# Patient Record
Sex: FEMALE | Race: WHITE | Employment: FULL TIME | URBAN - METROPOLITAN AREA
[De-identification: names, ages, dates, MRNs, and addresses within clinical notes are randomized per-mention and may not be internally consistent; named-entity substitution may affect disease eponyms.]

---

## 2020-07-19 ENCOUNTER — NURSE ONLY (OUTPATIENT)
Dept: FAMILY MEDICINE CLINIC | Age: 50
End: 2020-07-19

## 2020-07-19 NOTE — PROGRESS NOTES
Pt sent to [facility] flu clinic by PCP for Covid-19 testing. Patient arrived as a drive-thru visit and specimen was collected via oropharyngeal route and sent to lab. The flu clinic provider did not perform a physical assessment.

## 2020-07-22 ENCOUNTER — HOSPITAL ENCOUNTER (INPATIENT)
Age: 50
LOS: 3 days | Discharge: HOME | DRG: 190 | End: 2020-07-25
Attending: EMERGENCY MEDICINE | Admitting: INTERNAL MEDICINE
Payer: COMMERCIAL

## 2020-07-22 ENCOUNTER — APPOINTMENT (OUTPATIENT)
Dept: GENERAL RADIOLOGY | Age: 50
DRG: 190 | End: 2020-07-22
Payer: COMMERCIAL

## 2020-07-22 PROBLEM — J44.1 COPD EXACERBATION (HCC): Status: ACTIVE | Noted: 2020-07-22

## 2020-07-22 LAB
ALBUMIN SERPL-MCNC: 3.7 G/DL (ref 3.5–4.6)
ALP BLD-CCNC: 81 U/L (ref 40–130)
ALT SERPL-CCNC: 8 U/L (ref 0–33)
ANION GAP SERPL CALCULATED.3IONS-SCNC: 13 MEQ/L (ref 9–15)
AST SERPL-CCNC: 10 U/L (ref 0–35)
BASOPHILS ABSOLUTE: 0 K/UL (ref 0–0.2)
BASOPHILS RELATIVE PERCENT: 0.1 %
BILIRUB SERPL-MCNC: 0.3 MG/DL (ref 0.2–0.7)
BILIRUBIN URINE: NEGATIVE
BLOOD, URINE: NEGATIVE
BUN BLDV-MCNC: 11 MG/DL (ref 6–20)
CALCIUM SERPL-MCNC: 9.6 MG/DL (ref 8.5–9.9)
CHLORIDE BLD-SCNC: 100 MEQ/L (ref 95–107)
CLARITY: CLEAR
CO2: 28 MEQ/L (ref 20–31)
COLOR: YELLOW
CREAT SERPL-MCNC: 0.56 MG/DL (ref 0.5–0.9)
EOSINOPHILS ABSOLUTE: 0.5 K/UL (ref 0–0.7)
EOSINOPHILS RELATIVE PERCENT: 4.4 %
GFR AFRICAN AMERICAN: >60
GFR NON-AFRICAN AMERICAN: >60
GLOBULIN: 3.8 G/DL (ref 2.3–3.5)
GLUCOSE BLD-MCNC: 114 MG/DL (ref 70–99)
GLUCOSE URINE: NEGATIVE MG/DL
HCT VFR BLD CALC: 38.3 % (ref 37–47)
HEMOGLOBIN: 12.7 G/DL (ref 12–16)
KETONES, URINE: NEGATIVE MG/DL
LACTIC ACID: 1.2 MMOL/L (ref 0.5–2.2)
LEUKOCYTE ESTERASE, URINE: NEGATIVE
LYMPHOCYTES ABSOLUTE: 0.8 K/UL (ref 1–4.8)
LYMPHOCYTES RELATIVE PERCENT: 8.1 %
MCH RBC QN AUTO: 29.7 PG (ref 27–31.3)
MCHC RBC AUTO-ENTMCNC: 33.2 % (ref 33–37)
MCV RBC AUTO: 89.2 FL (ref 82–100)
MONOCYTES ABSOLUTE: 0.9 K/UL (ref 0.2–0.8)
MONOCYTES RELATIVE PERCENT: 8.4 %
NEUTROPHILS ABSOLUTE: 8.2 K/UL (ref 1.4–6.5)
NEUTROPHILS RELATIVE PERCENT: 79 %
NITRITE, URINE: NEGATIVE
PDW BLD-RTO: 13.9 % (ref 11.5–14.5)
PH UA: 7 (ref 5–9)
PLATELET # BLD: 250 K/UL (ref 130–400)
POTASSIUM SERPL-SCNC: 3.3 MEQ/L (ref 3.4–4.9)
PROTEIN UA: NEGATIVE MG/DL
RBC # BLD: 4.29 M/UL (ref 4.2–5.4)
SARS-COV-2, NAAT: NOT DETECTED
SARS-COV-2: NOT DETECTED
SODIUM BLD-SCNC: 141 MEQ/L (ref 135–144)
SOURCE: NORMAL
SPECIFIC GRAVITY UA: 1.01 (ref 1–1.03)
TOTAL PROTEIN: 7.5 G/DL (ref 6.3–8)
URINE REFLEX TO CULTURE: NORMAL
UROBILINOGEN, URINE: 0.2 E.U./DL
WBC # BLD: 10.4 K/UL (ref 4.8–10.8)

## 2020-07-22 PROCEDURE — 36415 COLL VENOUS BLD VENIPUNCTURE: CPT

## 2020-07-22 PROCEDURE — 2580000003 HC RX 258: Performed by: EMERGENCY MEDICINE

## 2020-07-22 PROCEDURE — 87040 BLOOD CULTURE FOR BACTERIA: CPT

## 2020-07-22 PROCEDURE — 6360000002 HC RX W HCPCS: Performed by: EMERGENCY MEDICINE

## 2020-07-22 PROCEDURE — 85025 COMPLETE CBC W/AUTO DIFF WBC: CPT

## 2020-07-22 PROCEDURE — 6370000000 HC RX 637 (ALT 250 FOR IP): Performed by: EMERGENCY MEDICINE

## 2020-07-22 PROCEDURE — U0002 COVID-19 LAB TEST NON-CDC: HCPCS

## 2020-07-22 PROCEDURE — 6360000002 HC RX W HCPCS: Performed by: INTERNAL MEDICINE

## 2020-07-22 PROCEDURE — 99285 EMERGENCY DEPT VISIT HI MDM: CPT

## 2020-07-22 PROCEDURE — 81003 URINALYSIS AUTO W/O SCOPE: CPT

## 2020-07-22 PROCEDURE — 71045 X-RAY EXAM CHEST 1 VIEW: CPT

## 2020-07-22 PROCEDURE — 94640 AIRWAY INHALATION TREATMENT: CPT

## 2020-07-22 PROCEDURE — 83605 ASSAY OF LACTIC ACID: CPT

## 2020-07-22 PROCEDURE — 6370000000 HC RX 637 (ALT 250 FOR IP): Performed by: INTERNAL MEDICINE

## 2020-07-22 PROCEDURE — 96374 THER/PROPH/DIAG INJ IV PUSH: CPT

## 2020-07-22 PROCEDURE — 96375 TX/PRO/DX INJ NEW DRUG ADDON: CPT

## 2020-07-22 PROCEDURE — 2580000003 HC RX 258: Performed by: INTERNAL MEDICINE

## 2020-07-22 PROCEDURE — 1210000000 HC MED SURG R&B

## 2020-07-22 PROCEDURE — 80053 COMPREHEN METABOLIC PANEL: CPT

## 2020-07-22 RX ORDER — POLYETHYLENE GLYCOL 3350 17 G/17G
17 POWDER, FOR SOLUTION ORAL DAILY PRN
Status: DISCONTINUED | OUTPATIENT
Start: 2020-07-22 | End: 2020-07-25 | Stop reason: HOSPADM

## 2020-07-22 RX ORDER — M-VIT,TX,IRON,MINS/CALC/FOLIC 27MG-0.4MG
1 TABLET ORAL DAILY
COMMUNITY

## 2020-07-22 RX ORDER — GUAIFENESIN/DEXTROMETHORPHAN 100-10MG/5
5 SYRUP ORAL EVERY 4 HOURS PRN
Status: DISCONTINUED | OUTPATIENT
Start: 2020-07-22 | End: 2020-07-25 | Stop reason: HOSPADM

## 2020-07-22 RX ORDER — ACETAMINOPHEN 325 MG/1
650 TABLET ORAL EVERY 6 HOURS PRN
Status: DISCONTINUED | OUTPATIENT
Start: 2020-07-22 | End: 2020-07-25 | Stop reason: HOSPADM

## 2020-07-22 RX ORDER — OXYCODONE HYDROCHLORIDE AND ACETAMINOPHEN 5; 325 MG/1; MG/1
1 TABLET ORAL EVERY 8 HOURS PRN
Status: DISCONTINUED | OUTPATIENT
Start: 2020-07-22 | End: 2020-07-25 | Stop reason: HOSPADM

## 2020-07-22 RX ORDER — DIPHENHYDRAMINE HYDROCHLORIDE 50 MG/ML
25 INJECTION INTRAMUSCULAR; INTRAVENOUS ONCE
Status: COMPLETED | OUTPATIENT
Start: 2020-07-22 | End: 2020-07-22

## 2020-07-22 RX ORDER — NYSTATIN 100000 U/G
1 CREAM TOPICAL PRN
COMMUNITY
Start: 2020-07-15

## 2020-07-22 RX ORDER — PROMETHAZINE HYDROCHLORIDE 12.5 MG/1
12.5 TABLET ORAL EVERY 6 HOURS PRN
Status: DISCONTINUED | OUTPATIENT
Start: 2020-07-22 | End: 2020-07-25 | Stop reason: HOSPADM

## 2020-07-22 RX ORDER — SODIUM CHLORIDE 0.9 % (FLUSH) 0.9 %
10 SYRINGE (ML) INJECTION EVERY 12 HOURS SCHEDULED
Status: DISCONTINUED | OUTPATIENT
Start: 2020-07-22 | End: 2020-07-25 | Stop reason: HOSPADM

## 2020-07-22 RX ORDER — 0.9 % SODIUM CHLORIDE 0.9 %
1000 INTRAVENOUS SOLUTION INTRAVENOUS ONCE
Status: COMPLETED | OUTPATIENT
Start: 2020-07-22 | End: 2020-07-22

## 2020-07-22 RX ORDER — KETOROLAC TROMETHAMINE 30 MG/ML
30 INJECTION, SOLUTION INTRAMUSCULAR; INTRAVENOUS ONCE
Status: COMPLETED | OUTPATIENT
Start: 2020-07-22 | End: 2020-07-22

## 2020-07-22 RX ORDER — ACETAMINOPHEN 500 MG
1000 TABLET ORAL ONCE
Status: COMPLETED | OUTPATIENT
Start: 2020-07-22 | End: 2020-07-22

## 2020-07-22 RX ORDER — GUAIFENESIN 600 MG/1
600 TABLET, EXTENDED RELEASE ORAL 2 TIMES DAILY
Status: DISCONTINUED | OUTPATIENT
Start: 2020-07-22 | End: 2020-07-25 | Stop reason: HOSPADM

## 2020-07-22 RX ORDER — METOCLOPRAMIDE HYDROCHLORIDE 5 MG/ML
10 INJECTION INTRAMUSCULAR; INTRAVENOUS ONCE
Status: COMPLETED | OUTPATIENT
Start: 2020-07-22 | End: 2020-07-22

## 2020-07-22 RX ORDER — METHYLPREDNISOLONE SODIUM SUCCINATE 40 MG/ML
40 INJECTION, POWDER, LYOPHILIZED, FOR SOLUTION INTRAMUSCULAR; INTRAVENOUS EVERY 8 HOURS
Status: DISCONTINUED | OUTPATIENT
Start: 2020-07-22 | End: 2020-07-25 | Stop reason: HOSPADM

## 2020-07-22 RX ORDER — SODIUM CHLORIDE 0.9 % (FLUSH) 0.9 %
3 SYRINGE (ML) INJECTION EVERY 8 HOURS
Status: DISCONTINUED | OUTPATIENT
Start: 2020-07-22 | End: 2020-07-22

## 2020-07-22 RX ORDER — ALPRAZOLAM 0.25 MG/1
TABLET ORAL
COMMUNITY
Start: 2020-06-10

## 2020-07-22 RX ORDER — SODIUM CHLORIDE 0.9 % (FLUSH) 0.9 %
10 SYRINGE (ML) INJECTION PRN
Status: DISCONTINUED | OUTPATIENT
Start: 2020-07-22 | End: 2020-07-25 | Stop reason: HOSPADM

## 2020-07-22 RX ORDER — POTASSIUM CHLORIDE 20 MEQ/1
20 TABLET, EXTENDED RELEASE ORAL ONCE
Status: COMPLETED | OUTPATIENT
Start: 2020-07-22 | End: 2020-07-22

## 2020-07-22 RX ORDER — KETOROLAC TROMETHAMINE 30 MG/ML
30 INJECTION, SOLUTION INTRAMUSCULAR; INTRAVENOUS EVERY 6 HOURS PRN
Status: DISCONTINUED | OUTPATIENT
Start: 2020-07-22 | End: 2020-07-25 | Stop reason: HOSPADM

## 2020-07-22 RX ORDER — ONDANSETRON 2 MG/ML
4 INJECTION INTRAMUSCULAR; INTRAVENOUS EVERY 6 HOURS PRN
Status: DISCONTINUED | OUTPATIENT
Start: 2020-07-22 | End: 2020-07-25 | Stop reason: HOSPADM

## 2020-07-22 RX ORDER — ACETAMINOPHEN 650 MG/1
650 SUPPOSITORY RECTAL EVERY 6 HOURS PRN
Status: DISCONTINUED | OUTPATIENT
Start: 2020-07-22 | End: 2020-07-25 | Stop reason: HOSPADM

## 2020-07-22 RX ORDER — LISDEXAMFETAMINE DIMESYLATE 30 MG/1
CAPSULE ORAL
COMMUNITY
Start: 2020-04-20

## 2020-07-22 RX ORDER — ALPRAZOLAM 0.25 MG/1
0.25 TABLET ORAL 3 TIMES DAILY PRN
Status: DISCONTINUED | OUTPATIENT
Start: 2020-07-22 | End: 2020-07-25 | Stop reason: HOSPADM

## 2020-07-22 RX ORDER — POTASSIUM CHLORIDE 20 MEQ/1
20 TABLET, EXTENDED RELEASE ORAL ONCE
Status: DISCONTINUED | OUTPATIENT
Start: 2020-07-22 | End: 2020-07-22

## 2020-07-22 RX ORDER — ACETAMINOPHEN 325 MG/1
650 TABLET ORAL EVERY 4 HOURS PRN
Status: DISCONTINUED | OUTPATIENT
Start: 2020-07-22 | End: 2020-07-25 | Stop reason: HOSPADM

## 2020-07-22 RX ORDER — ALBUTEROL SULFATE 90 UG/1
2 AEROSOL, METERED RESPIRATORY (INHALATION) EVERY 6 HOURS PRN
Status: DISCONTINUED | OUTPATIENT
Start: 2020-07-22 | End: 2020-07-25 | Stop reason: HOSPADM

## 2020-07-22 RX ORDER — PSEUDOEPHEDRINE HCL 120 MG
400 TABLET, EXTENDED RELEASE ORAL DAILY
COMMUNITY

## 2020-07-22 RX ADMIN — ENOXAPARIN SODIUM 40 MG: 40 INJECTION SUBCUTANEOUS at 17:36

## 2020-07-22 RX ADMIN — DIPHENHYDRAMINE HYDROCHLORIDE 25 MG: 50 INJECTION INTRAMUSCULAR; INTRAVENOUS at 13:46

## 2020-07-22 RX ADMIN — METOCLOPRAMIDE 10 MG: 5 INJECTION, SOLUTION INTRAMUSCULAR; INTRAVENOUS at 13:47

## 2020-07-22 RX ADMIN — AZITHROMYCIN MONOHYDRATE 500 MG: 500 INJECTION, POWDER, LYOPHILIZED, FOR SOLUTION INTRAVENOUS at 14:20

## 2020-07-22 RX ADMIN — Medication 10 ML: at 20:57

## 2020-07-22 RX ADMIN — ACETAMINOPHEN 1000 MG: 500 TABLET ORAL at 13:38

## 2020-07-22 RX ADMIN — SODIUM CHLORIDE 1000 ML: 9 INJECTION, SOLUTION INTRAVENOUS at 13:38

## 2020-07-22 RX ADMIN — ACETAMINOPHEN 650 MG: 325 TABLET ORAL at 20:53

## 2020-07-22 RX ADMIN — GUAIFENESIN 600 MG: 600 TABLET, EXTENDED RELEASE ORAL at 20:53

## 2020-07-22 RX ADMIN — CEFTRIAXONE 1 G: 1 INJECTION, POWDER, FOR SOLUTION INTRAMUSCULAR; INTRAVENOUS at 14:18

## 2020-07-22 RX ADMIN — ALBUTEROL SULFATE 2 PUFF: 108 AEROSOL, METERED RESPIRATORY (INHALATION) at 14:04

## 2020-07-22 RX ADMIN — KETOROLAC TROMETHAMINE 30 MG: 30 INJECTION, SOLUTION INTRAMUSCULAR at 13:46

## 2020-07-22 RX ADMIN — Medication 3 ML: at 13:38

## 2020-07-22 RX ADMIN — POTASSIUM CHLORIDE 20 MEQ: 1500 TABLET, EXTENDED RELEASE ORAL at 14:35

## 2020-07-22 RX ADMIN — METHYLPREDNISOLONE SODIUM SUCCINATE 40 MG: 40 INJECTION, POWDER, FOR SOLUTION INTRAMUSCULAR; INTRAVENOUS at 20:57

## 2020-07-22 ASSESSMENT — ENCOUNTER SYMPTOMS
COUGH: 1
TROUBLE SWALLOWING: 0
STRIDOR: 0
ABDOMINAL PAIN: 0
EYE PAIN: 0
SHORTNESS OF BREATH: 1
EYE DISCHARGE: 0
FACIAL SWELLING: 0
CHEST TIGHTNESS: 0
VOMITING: 0
WHEEZING: 1
CHOKING: 0
VOICE CHANGE: 0
SORE THROAT: 0
DIARRHEA: 0
BLOOD IN STOOL: 0
CONSTIPATION: 0
BACK PAIN: 0
EYE REDNESS: 0
NAUSEA: 1
SINUS PRESSURE: 0

## 2020-07-22 ASSESSMENT — PAIN DESCRIPTION - DESCRIPTORS
DESCRIPTORS: ACHING
DESCRIPTORS: HEADACHE;THROBBING

## 2020-07-22 ASSESSMENT — PAIN SCALES - GENERAL
PAINLEVEL_OUTOF10: 0
PAINLEVEL_OUTOF10: 0
PAINLEVEL_OUTOF10: 10
PAINLEVEL_OUTOF10: 4
PAINLEVEL_OUTOF10: 0
PAINLEVEL_OUTOF10: 4

## 2020-07-22 ASSESSMENT — PAIN DESCRIPTION - PAIN TYPE
TYPE: ACUTE PAIN
TYPE: ACUTE PAIN

## 2020-07-22 ASSESSMENT — PAIN DESCRIPTION - LOCATION
LOCATION: HEAD
LOCATION: HEAD

## 2020-07-22 ASSESSMENT — PAIN DESCRIPTION - PROGRESSION: CLINICAL_PROGRESSION: GRADUALLY WORSENING

## 2020-07-22 ASSESSMENT — PAIN DESCRIPTION - ORIENTATION: ORIENTATION: POSTERIOR;RIGHT;LEFT

## 2020-07-22 ASSESSMENT — PAIN DESCRIPTION - FREQUENCY
FREQUENCY: CONTINUOUS
FREQUENCY: INTERMITTENT

## 2020-07-22 ASSESSMENT — PAIN DESCRIPTION - ONSET
ONSET: ON-GOING
ONSET: SUDDEN

## 2020-07-22 ASSESSMENT — PAIN - FUNCTIONAL ASSESSMENT: PAIN_FUNCTIONAL_ASSESSMENT: PREVENTS OR INTERFERES SOME ACTIVE ACTIVITIES AND ADLS

## 2020-07-22 NOTE — ED PROVIDER NOTES
[facility] ED  eMERGENCY dEPARTMENT eNCOUnter      Pt Name: Garima Horton  MRN: [id number]  [place] 1970  Date of evaluation: 7/22/2020  Provider: Rachelle Birch MD    [address]       Chief Complaint   Patient presents with    Cough     fever, sob, headache x 5 days      HISTORY OF PRESENT ILLNESS   (Location/Symptom, Timing/Onset,Context/Setting, Quality, Duration, Modifying Factors, Severity)  Note limiting factors. Garima Horton is a 48 y.o. female who presents to the emergency department as per patient she is sick for the past 6 days time with cold cough congestion short of breath fever and chills body aches headache patient she short of breath mostly dry cough mother at home with COPD but no fever patient not a smoker nausea but no vomiting no diarrhea    HPI    NursingNotes were reviewed. REVIEW OF SYSTEMS    (2-9 systems for level 4, 10 or more for level 5)     Review of Systems   Constitutional: Positive for activity change, appetite change, chills, diaphoresis, fatigue and fever. HENT: Positive for congestion. Negative for drooling, facial swelling, mouth sores, nosebleeds, sinus pressure, sore throat, trouble swallowing and voice change. Eyes: Negative for pain, discharge, redness and visual disturbance. Respiratory: Positive for cough, shortness of breath and wheezing. Negative for choking, chest tightness and stridor. Cardiovascular: Negative for chest pain, palpitations and leg swelling. Gastrointestinal: Positive for nausea. Negative for abdominal pain, blood in stool, constipation, diarrhea and vomiting. Endocrine: Negative for cold intolerance, polyphagia and polyuria. Genitourinary: Negative for dysuria, flank pain, frequency, genital sores and urgency. Musculoskeletal: Negative for back pain, joint swelling, neck pain and neck stiffness. Skin: Negative for pallor and rash. Neurological: Positive for headaches.  Negative for tremors, seizures, syncope, weakness and numbness. Hematological: Negative for adenopathy. Does not bruise/bleed easily. Psychiatric/Behavioral: Negative for agitation, behavioral problems, hallucinations and sleep disturbance. The patient is not hyperactive. All other systems reviewed and are negative. Except as noted above the remainder of the review of systems was reviewed and negative. PAST MEDICAL HISTORY     Past Medical History:   Diagnosis Date    ADHD          SURGICALHISTORY     History reviewed. No pertinent surgical history. CURRENT MEDICATIONS       Previous Medications    ALPRAZOLAM (XANAX) 0.25 MG TABLET    TAKE 1 TABLET BY MOUTH EVERY DAY AS NEEDED for 30 days    VITAMIN D (CHOLECALCIFEROL) 250 MCG (96495 UT) CAPS CAPSULE    Take 1,000 Units by mouth daily    VYVANSE 30 MG CAPSULE    take 1 capsule by mouth every morning       ALLERGIES     Patient has no known allergies. FAMILY HISTORY     History reviewed. No pertinent family history.        SOCIAL HISTORY       Social History     Socioeconomic History    Marital status: Life Partner     Spouse name: None    Number of children: None    Years of education: None    Highest education level: None   Occupational History    None   Social Needs    Financial resource strain: None    Food insecurity     Worry: None     Inability: None    Transportation needs     Medical: None     Non-medical: None   Tobacco Use    Smoking status: Never Smoker    Smokeless tobacco: Never Used   Substance and Sexual Activity    Alcohol use: Not Currently    Drug use: Never    Sexual activity: None   Lifestyle    Physical activity     Days per week: None     Minutes per session: None    Stress: None   Relationships    Social connections     Talks on phone: None     Gets together: None     Attends Christianity service: None     Active member of club or organization: None     Attends meetings of clubs or organizations: None     Relationship status: None   

## 2020-07-22 NOTE — PROGRESS NOTES
Pt admitted to room 202 for pneumonia. Has 2 negative covid 19 tests. AAOX4. No assistive devices, ambulates with steady gait. VS as charted and assessment complete. Pt verbalizes understanding of plan of care. Denies any questions at this time. Oriented to room and call light. Dinner order placed with dietary. Will continue to monitor.

## 2020-07-23 ENCOUNTER — APPOINTMENT (OUTPATIENT)
Dept: GENERAL RADIOLOGY | Age: 50
DRG: 190 | End: 2020-07-23
Payer: COMMERCIAL

## 2020-07-23 LAB
ANION GAP SERPL CALCULATED.3IONS-SCNC: 11 MEQ/L (ref 9–15)
BUN BLDV-MCNC: 15 MG/DL (ref 6–20)
CALCIUM SERPL-MCNC: 9.7 MG/DL (ref 8.5–9.9)
CHLORIDE BLD-SCNC: 102 MEQ/L (ref 95–107)
CO2: 28 MEQ/L (ref 20–31)
CREAT SERPL-MCNC: 0.53 MG/DL (ref 0.5–0.9)
GFR AFRICAN AMERICAN: >60
GFR NON-AFRICAN AMERICAN: >60
GLUCOSE BLD-MCNC: 152 MG/DL (ref 70–99)
HCT VFR BLD CALC: 37.7 % (ref 37–47)
HEMOGLOBIN: 12.5 G/DL (ref 12–16)
MCH RBC QN AUTO: 29.5 PG (ref 27–31.3)
MCHC RBC AUTO-ENTMCNC: 33.1 % (ref 33–37)
MCV RBC AUTO: 89.2 FL (ref 82–100)
PDW BLD-RTO: 14.4 % (ref 11.5–14.5)
PLATELET # BLD: 266 K/UL (ref 130–400)
POTASSIUM REFLEX MAGNESIUM: 4.3 MEQ/L (ref 3.4–4.9)
RBC # BLD: 4.23 M/UL (ref 4.2–5.4)
SODIUM BLD-SCNC: 141 MEQ/L (ref 135–144)
WBC # BLD: 10.6 K/UL (ref 4.8–10.8)

## 2020-07-23 PROCEDURE — 6360000002 HC RX W HCPCS: Performed by: INTERNAL MEDICINE

## 2020-07-23 PROCEDURE — 6370000000 HC RX 637 (ALT 250 FOR IP): Performed by: INTERNAL MEDICINE

## 2020-07-23 PROCEDURE — 1210000000 HC MED SURG R&B

## 2020-07-23 PROCEDURE — 94640 AIRWAY INHALATION TREATMENT: CPT

## 2020-07-23 PROCEDURE — 94761 N-INVAS EAR/PLS OXIMETRY MLT: CPT

## 2020-07-23 PROCEDURE — 80048 BASIC METABOLIC PNL TOTAL CA: CPT

## 2020-07-23 PROCEDURE — 36415 COLL VENOUS BLD VENIPUNCTURE: CPT

## 2020-07-23 PROCEDURE — 2580000003 HC RX 258: Performed by: INTERNAL MEDICINE

## 2020-07-23 PROCEDURE — 85027 COMPLETE CBC AUTOMATED: CPT

## 2020-07-23 RX ORDER — IPRATROPIUM BROMIDE AND ALBUTEROL SULFATE 2.5; .5 MG/3ML; MG/3ML
1 SOLUTION RESPIRATORY (INHALATION) 3 TIMES DAILY
Status: DISCONTINUED | OUTPATIENT
Start: 2020-07-23 | End: 2020-07-25 | Stop reason: HOSPADM

## 2020-07-23 RX ORDER — VITAMIN B COMPLEX
1000 TABLET ORAL DAILY
Status: DISCONTINUED | OUTPATIENT
Start: 2020-07-23 | End: 2020-07-25 | Stop reason: HOSPADM

## 2020-07-23 RX ORDER — M-VIT,TX,IRON,MINS/CALC/FOLIC 27MG-0.4MG
1 TABLET ORAL DAILY
Status: DISCONTINUED | OUTPATIENT
Start: 2020-07-23 | End: 2020-07-25 | Stop reason: HOSPADM

## 2020-07-23 RX ADMIN — VITAMIN D, TAB 1000IU (100/BT) 1000 UNITS: 25 TAB at 09:05

## 2020-07-23 RX ADMIN — MAGNESIUM GLUCONATE 500 MG ORAL TABLET 400 MG: 500 TABLET ORAL at 09:05

## 2020-07-23 RX ADMIN — CEFTRIAXONE 1 G: 1 INJECTION, POWDER, FOR SOLUTION INTRAMUSCULAR; INTRAVENOUS at 13:19

## 2020-07-23 RX ADMIN — GUAIFENESIN 600 MG: 600 TABLET, EXTENDED RELEASE ORAL at 09:05

## 2020-07-23 RX ADMIN — ENOXAPARIN SODIUM 40 MG: 40 INJECTION SUBCUTANEOUS at 09:05

## 2020-07-23 RX ADMIN — Medication 10 ML: at 09:06

## 2020-07-23 RX ADMIN — MULTIPLE VITAMINS W/ MINERALS TAB 1 TABLET: TAB at 09:05

## 2020-07-23 RX ADMIN — METHYLPREDNISOLONE SODIUM SUCCINATE 40 MG: 40 INJECTION, POWDER, FOR SOLUTION INTRAMUSCULAR; INTRAVENOUS at 06:09

## 2020-07-23 RX ADMIN — ACETAMINOPHEN 650 MG: 325 TABLET ORAL at 13:09

## 2020-07-23 RX ADMIN — AZITHROMYCIN DIHYDRATE 500 MG: 500 INJECTION, POWDER, LYOPHILIZED, FOR SOLUTION INTRAVENOUS at 13:18

## 2020-07-23 RX ADMIN — OXYCODONE HYDROCHLORIDE AND ACETAMINOPHEN 1 TABLET: 5; 325 TABLET ORAL at 21:25

## 2020-07-23 RX ADMIN — METHYLPREDNISOLONE SODIUM SUCCINATE 40 MG: 40 INJECTION, POWDER, FOR SOLUTION INTRAMUSCULAR; INTRAVENOUS at 13:18

## 2020-07-23 RX ADMIN — IPRATROPIUM BROMIDE AND ALBUTEROL SULFATE 1 AMPULE: .5; 3 SOLUTION RESPIRATORY (INHALATION) at 18:02

## 2020-07-23 RX ADMIN — METHYLPREDNISOLONE SODIUM SUCCINATE 40 MG: 40 INJECTION, POWDER, FOR SOLUTION INTRAMUSCULAR; INTRAVENOUS at 19:38

## 2020-07-23 RX ADMIN — IPRATROPIUM BROMIDE AND ALBUTEROL SULFATE 1 AMPULE: .5; 3 SOLUTION RESPIRATORY (INHALATION) at 11:13

## 2020-07-23 RX ADMIN — GUAIFENESIN 600 MG: 600 TABLET, EXTENDED RELEASE ORAL at 19:37

## 2020-07-23 RX ADMIN — Medication 10 ML: at 19:38

## 2020-07-23 ASSESSMENT — PAIN SCALES - GENERAL
PAINLEVEL_OUTOF10: 0
PAINLEVEL_OUTOF10: 3
PAINLEVEL_OUTOF10: 7

## 2020-07-23 NOTE — H&P
Hospital Medicine History & Physical      PCP: Layo Baum MD    Date of Admission: 7/22/2020    Date of Service: 7/23/20      Chief Complaint:  Dyspnea, cough, fever       History Of Present Illness:  48 y.o. female who presented to [facility] with above complains for the past 6 days, was evaluated by PCP, COVID test was negative. Despite respiratory Tx her condition wasn't improving, she denied sputum production, CP. No other family members had similar symptoms. Eventually she came to ER where after initial stabilization was admitted for further evaluation/treatment     Past Medical History:          Diagnosis Date    ADHD        Past Surgical History:      History reviewed. No pertinent surgical history. Medications Prior to Admission:      Prior to Admission medications    Medication Sig Start Date End Date Taking? Authorizing Provider   vitamin D (CHOLECALCIFEROL) 250 MCG (52939 UT) CAPS capsule Take 1,000 Units by mouth daily   Yes Historical Provider, MD   ALPRAZolam (XANAX) 0.25 MG tablet TAKE 1 TABLET BY MOUTH EVERY DAY AS NEEDED for 30 days 6/10/20  Yes Historical Provider, MD   Multiple Vitamins-Minerals (THERAPEUTIC MULTIVITAMIN-MINERALS) tablet Take 1 tablet by mouth daily   Yes Historical Provider, MD   Magnesium (CVS TRIPLE MAGNESIUM COMPLEX) 400 MG CAPS Take 400 mg by mouth daily   Yes Historical Provider, MD   CVS EVENING PRIMROSE OIL PO Take 1 capsule by mouth daily   Yes Historical Provider, MD   VYVANSE 30 MG capsule take 1 capsule by mouth every morning 4/20/20   Historical Provider, MD   nystatin (MYCOSTATIN) 304820 UNIT/GM cream Apply 1 Tube topically as needed 7/15/20   Historical Provider, MD       Allergies:  Patient has no known allergies. Social History:      The patient currently lives home    TOBACCO:   reports that she has never smoked. She has never used smokeless tobacco.  ETOH:   reports previous alcohol use.       Family History:       Reviewed in detail and negative for DM, CAD, Cancer, CVA. Positive as follows:    History reviewed. No pertinent family history. REVIEW OF SYSTEMS:   Pertinent positives as noted in the HPI. All other systems reviewed and negative. PHYSICAL EXAM:    /72   Pulse 68   Temp 98.8 °F (37.1 °C)   Resp 18   Ht 5' 6\" (1.676 m)   Wt 173 lb 9.6 oz (78.7 kg)   LMP 03/22/2020 (Approximate)   SpO2 95%   BMI 28.02 kg/m²     General appearance:  No apparent distress, appears stated age and cooperative. HEENT:  Normal cephalic, atraumatic without obvious deformity. Pupils equal, round, and reactive to light. Extra ocular muscles intact. Conjunctivae/corneas clear. Neck: Supple, with full range of motion. No jugular venous distention. Trachea midline. Respiratory:  Normal respiratory effort. Clear to auscultation, bilaterally without Rales/Wheezes/Rhonchi. Cardiovascular:  Regular rate and rhythm with normal S1/S2 without murmurs, rubs or gallops. Abdomen: Soft, non-tender, non-distended with normal bowel sounds. Musculoskeletal:  No clubbing, cyanosis or edema bilaterally. Full range of motion without deformity. Skin: Skin color, texture, turgor normal.  No rashes or lesions. Neurologic:  Neurovascularly intact without any focal sensory/motor deficits. Cranial nerves: II-XII intact, grossly non-focal.  Psychiatric:  Alert and oriented, thought content appropriate, normal insight  Capillary Refill: Brisk,< 3 seconds   Peripheral Pulses: +2 palpable, equal bilaterally       Labs:     Recent Labs     07/22/20  1332 07/23/20  0639   WBC 10.4 10.6   HGB 12.7 12.5   HCT 38.3 37.7    266     Recent Labs     07/22/20  1332 07/23/20  0640    141   K 3.3* 4.3    102   CO2 28 28   BUN 11 15   CREATININE 0.56 0.53   CALCIUM 9.6 9.7     Recent Labs     07/22/20  1332   AST 10   ALT 8   BILITOT 0.3   ALKPHOS 81     No results for input(s): INR in the last 72 hours.   No results for input(s): Thelda Rough in the

## 2020-07-23 NOTE — PLAN OF CARE
Problem: Discharge Planning:  Goal: Discharged to appropriate level of care  Description: Discharged to appropriate level of care  Outcome: Met This Shift  Goal: Participates in care planning  Description: Participates in care planning  Outcome: Met This Shift     Problem: Airway Clearance - Ineffective:  Goal: Clear lung sounds  Description: Clear lung sounds  Outcome: Met This Shift  Goal: Ability to maintain a clear airway will improve  Description: Ability to maintain a clear airway will improve  Outcome: Met This Shift     Problem: Gas Exchange - Impaired:  Goal: Levels of oxygenation will improve  Description: Levels of oxygenation will improve  Outcome: Met This Shift     Problem: Hyperthermia:  Goal: Ability to maintain a body temperature in the normal range will improve  Description: Ability to maintain a body temperature in the normal range will improve  Outcome: Met This Shift

## 2020-07-24 ENCOUNTER — APPOINTMENT (OUTPATIENT)
Dept: CT IMAGING | Age: 50
DRG: 190 | End: 2020-07-24
Payer: COMMERCIAL

## 2020-07-24 PROCEDURE — 2580000003 HC RX 258: Performed by: INTERNAL MEDICINE

## 2020-07-24 PROCEDURE — 6370000000 HC RX 637 (ALT 250 FOR IP): Performed by: INTERNAL MEDICINE

## 2020-07-24 PROCEDURE — 71275 CT ANGIOGRAPHY CHEST: CPT

## 2020-07-24 PROCEDURE — 6360000002 HC RX W HCPCS: Performed by: INTERNAL MEDICINE

## 2020-07-24 PROCEDURE — 2700000000 HC OXYGEN THERAPY PER DAY

## 2020-07-24 PROCEDURE — 6360000004 HC RX CONTRAST MEDICATION: Performed by: INTERNAL MEDICINE

## 2020-07-24 PROCEDURE — 1210000000 HC MED SURG R&B

## 2020-07-24 RX ORDER — CALCIUM CARBONATE 200(500)MG
500 TABLET,CHEWABLE ORAL 3 TIMES DAILY PRN
Status: DISCONTINUED | OUTPATIENT
Start: 2020-07-24 | End: 2020-07-25 | Stop reason: HOSPADM

## 2020-07-24 RX ORDER — PANTOPRAZOLE SODIUM 40 MG/1
40 TABLET, DELAYED RELEASE ORAL
Status: DISCONTINUED | OUTPATIENT
Start: 2020-07-24 | End: 2020-07-24

## 2020-07-24 RX ORDER — FAMOTIDINE 20 MG/1
20 TABLET, FILM COATED ORAL 2 TIMES DAILY
Status: DISCONTINUED | OUTPATIENT
Start: 2020-07-24 | End: 2020-07-25 | Stop reason: HOSPADM

## 2020-07-24 RX ORDER — L. ACIDOPHILUS/L.BULGARICUS 1MM CELL
1 TABLET ORAL 3 TIMES DAILY
Status: DISCONTINUED | OUTPATIENT
Start: 2020-07-24 | End: 2020-07-25 | Stop reason: HOSPADM

## 2020-07-24 RX ADMIN — MULTIPLE VITAMINS W/ MINERALS TAB 1 TABLET: TAB at 08:24

## 2020-07-24 RX ADMIN — VITAMIN D, TAB 1000IU (100/BT) 1000 UNITS: 25 TAB at 08:24

## 2020-07-24 RX ADMIN — CEFTRIAXONE 1 G: 1 INJECTION, POWDER, FOR SOLUTION INTRAMUSCULAR; INTRAVENOUS at 14:51

## 2020-07-24 RX ADMIN — MAGNESIUM GLUCONATE 500 MG ORAL TABLET 400 MG: 500 TABLET ORAL at 08:24

## 2020-07-24 RX ADMIN — GUAIFENESIN 600 MG: 600 TABLET, EXTENDED RELEASE ORAL at 21:22

## 2020-07-24 RX ADMIN — FAMOTIDINE 20 MG: 20 TABLET, FILM COATED ORAL at 21:22

## 2020-07-24 RX ADMIN — ENOXAPARIN SODIUM 40 MG: 40 INJECTION SUBCUTANEOUS at 08:24

## 2020-07-24 RX ADMIN — METHYLPREDNISOLONE SODIUM SUCCINATE 40 MG: 40 INJECTION, POWDER, FOR SOLUTION INTRAMUSCULAR; INTRAVENOUS at 12:55

## 2020-07-24 RX ADMIN — Medication 10 ML: at 21:22

## 2020-07-24 RX ADMIN — METHYLPREDNISOLONE SODIUM SUCCINATE 40 MG: 40 INJECTION, POWDER, FOR SOLUTION INTRAMUSCULAR; INTRAVENOUS at 04:28

## 2020-07-24 RX ADMIN — LACTOBACILLUS TAB 1 TABLET: TAB at 21:22

## 2020-07-24 RX ADMIN — GUAIFENESIN AND DEXTROMETHORPHAN 5 ML: 100; 10 SYRUP ORAL at 01:30

## 2020-07-24 RX ADMIN — IOPAMIDOL 100 ML: 755 INJECTION, SOLUTION INTRAVENOUS at 08:55

## 2020-07-24 RX ADMIN — METHYLPREDNISOLONE SODIUM SUCCINATE 40 MG: 40 INJECTION, POWDER, FOR SOLUTION INTRAMUSCULAR; INTRAVENOUS at 21:21

## 2020-07-24 RX ADMIN — ANTACID TABLETS 500 MG: 500 TABLET, CHEWABLE ORAL at 03:05

## 2020-07-24 RX ADMIN — AZITHROMYCIN DIHYDRATE 500 MG: 500 INJECTION, POWDER, LYOPHILIZED, FOR SOLUTION INTRAVENOUS at 15:21

## 2020-07-24 RX ADMIN — Medication 10 ML: at 08:28

## 2020-07-24 RX ADMIN — GUAIFENESIN 600 MG: 600 TABLET, EXTENDED RELEASE ORAL at 08:24

## 2020-07-24 RX ADMIN — FAMOTIDINE 20 MG: 20 TABLET, FILM COATED ORAL at 08:24

## 2020-07-24 ASSESSMENT — PAIN SCALES - GENERAL
PAINLEVEL_OUTOF10: 0
PAINLEVEL_OUTOF10: 0

## 2020-07-24 NOTE — PROGRESS NOTES
Hospitalist Progress Note      PCP: Dior Kumar MD    Date of Admission: 7/22/2020    Chief Complaint: cough, dyspnea    Subjective: pt awake/alert     Medications:  Reviewed    Infusion Medications   Scheduled Medications    famotidine  20 mg Oral BID    therapeutic multivitamin-minerals  1 tablet Oral Daily    Vitamin D  1,000 Units Oral Daily    magnesium oxide  400 mg Oral Daily    [Held by provider] ipratropium-albuterol  1 ampule Inhalation TID    sodium chloride flush  10 mL Intravenous 2 times per day    enoxaparin  40 mg Subcutaneous Daily    methylPREDNISolone  40 mg Intravenous Q8H    cefTRIAXone (ROCEPHIN) IV  1 g Intravenous Q24H    azithromycin  500 mg Intravenous Q24H    guaiFENesin  600 mg Oral BID     PRN Meds: calcium carbonate, albuterol sulfate HFA, ALPRAZolam, sodium chloride flush, acetaminophen **OR** acetaminophen, polyethylene glycol, promethazine **OR** ondansetron, oxyCODONE-acetaminophen, ketorolac, guaiFENesin-dextromethorphan, acetaminophen    No intake or output data in the 24 hours ending 07/24/20 0809    Exam:    /72   Pulse 68   Temp 98.8 °F (37.1 °C)   Resp 18   Ht 5' 6\" (1.676 m)   Wt 173 lb 9.6 oz (78.7 kg)   LMP 03/22/2020 (Approximate)   SpO2 93%   BMI 28.02 kg/m²     General appearance: No apparent distress, appears stated age and cooperative. HEENT: Pupils equal, round, and reactive to light. Conjunctivae/corneas clear. Neck: Supple, with full range of motion. No jugular venous distention. Trachea midline. Respiratory:  Normal respiratory effort. Clear to auscultation, bilaterally without Rales/Wheezes/Rhonchi. Cardiovascular: Regular rate and rhythm with normal S1/S2 without murmurs, rubs or gallops. Abdomen: Soft, non-tender, non-distended with normal bowel sounds. Musculoskeletal: No clubbing, cyanosis or edema bilaterally. Full range of motion without deformity.   Skin: Skin color, texture, turgor normal.  No rashes or lesions. Neurologic:  Neurovascularly intact without any focal sensory/motor deficits. Cranial nerves: II-XII intact, grossly non-focal.  Psychiatric: Alert and oriented, thought content appropriate, normal insight  Capillary Refill: Brisk,< 3 seconds   Peripheral Pulses: +2 palpable, equal bilaterally       Labs:   Recent Labs     07/22/20  1332 07/23/20  0639   WBC 10.4 10.6   HGB 12.7 12.5   HCT 38.3 37.7    266     Recent Labs     07/22/20  1332 07/23/20  0640    141   K 3.3* 4.3    102   CO2 28 28   BUN 11 15   CREATININE 0.56 0.53   CALCIUM 9.6 9.7     Recent Labs     07/22/20  1332   AST 10   ALT 8   BILITOT 0.3   ALKPHOS 81     No results for input(s): INR in the last 72 hours. No results for input(s): Ramirez Kwesi in the last 72 hours. Urinalysis:      Lab Results   Component Value Date    NITRU Negative 07/22/2020    WBCUA 0-2 12/31/2011    BACTERIA 1+ 12/31/2011    RBCUA 0-2 12/31/2011    BLOODU Negative 07/22/2020    SPECGRAV 1.010 07/22/2020    GLUCOSEU Negative 07/22/2020    GLUCOSEU NEG 12/31/2011       Radiology:  XR CHEST PORTABLE   Final Result   ABNORMAL CHEST. POSSIBLE DIFFUSE PNEUMONITIS. CLOSE FOLLOW-UP RECOMMENDED.               Assessment/Plan:    Active Hospital Problems    Diagnosis Date Noted    COPD exacerbation ([facility] [address] [J44.1] 07/22/2020         DVT Prophylaxis: lovenox  Diet: DIET GENERAL;  Code Status: Full Code    PT/OT Eval Status: done    Dispo - Dyspnea/cough, fever, changes on chest X ray due to CAP- COVID was negative x2- treatment for CAP initiated- continue with IV atbs/steroids,   Acute respiratory failure with desaturation due to above- O2 initiated, will rule out PE with CTA chest today since she has Sivlbo2Ocuphuo deficiency   Hypokalemia on admission- replaced   Anxiety- controlled  Medically stable for acute admission at [facility]       Electronically signed by Choe Hightower MD on 7/24/2020 at 8:09 AM

## 2020-07-24 NOTE — PLAN OF CARE
Problem: Discharge Planning:  Goal: Discharged to appropriate level of care  Description: Discharged to appropriate level of care  Outcome: Ongoing  Goal: Participates in care planning  Description: Participates in care planning  Outcome: Ongoing

## 2020-07-24 NOTE — PROGRESS NOTES
Pt showered with assist from Kiana Arroyo. Pt has no complaints of any pain at this time. Pt remains on oxygen via nasal cannula at 2 liters. Will continue to monitor.

## 2020-07-24 NOTE — PROGRESS NOTES
Placed patient on room air for nocturnal study. Spo2 decreased to 80%. Placed back on 3L and patients Spo2 increased to 93%.

## 2020-07-25 VITALS
TEMPERATURE: 98.2 F | SYSTOLIC BLOOD PRESSURE: 149 MMHG | OXYGEN SATURATION: 97 % | HEIGHT: 66 IN | HEART RATE: 63 BPM | BODY MASS INDEX: 27.9 KG/M2 | WEIGHT: 173.6 LBS | DIASTOLIC BLOOD PRESSURE: 76 MMHG | RESPIRATION RATE: 18 BRPM

## 2020-07-25 PROCEDURE — 6370000000 HC RX 637 (ALT 250 FOR IP): Performed by: INTERNAL MEDICINE

## 2020-07-25 PROCEDURE — 6360000002 HC RX W HCPCS: Performed by: INTERNAL MEDICINE

## 2020-07-25 RX ORDER — PREDNISONE 20 MG/1
20 TABLET ORAL DAILY
Qty: 5 TABLET | Refills: 0 | Status: SHIPPED | OUTPATIENT
Start: 2020-07-25 | End: 2020-07-27

## 2020-07-25 RX ORDER — GUAIFENESIN 600 MG/1
600 TABLET, EXTENDED RELEASE ORAL 2 TIMES DAILY
Qty: 20 TABLET | Refills: 0 | Status: SHIPPED | OUTPATIENT
Start: 2020-07-25 | End: 2020-07-25

## 2020-07-25 RX ORDER — FAMOTIDINE 20 MG/1
20 TABLET, FILM COATED ORAL 2 TIMES DAILY
Qty: 60 TABLET | Refills: 0 | Status: SHIPPED | OUTPATIENT
Start: 2020-07-25

## 2020-07-25 RX ORDER — FAMOTIDINE 20 MG/1
20 TABLET, FILM COATED ORAL 2 TIMES DAILY
Qty: 60 TABLET | Refills: 0 | Status: SHIPPED | OUTPATIENT
Start: 2020-07-25 | End: 2020-07-25

## 2020-07-25 RX ORDER — L. ACIDOPHILUS/L.BULGARICUS 1MM CELL
1 TABLET ORAL 3 TIMES DAILY
Qty: 30 TABLET | Refills: 0 | Status: SHIPPED | OUTPATIENT
Start: 2020-07-25

## 2020-07-25 RX ORDER — PREDNISONE 20 MG/1
20 TABLET ORAL DAILY
Qty: 5 TABLET | Refills: 0 | Status: SHIPPED | OUTPATIENT
Start: 2020-07-25 | End: 2020-07-30

## 2020-07-25 RX ORDER — L. ACIDOPHILUS/L.BULGARICUS 1MM CELL
1 TABLET ORAL 3 TIMES DAILY
Qty: 30 TABLET | Refills: 0 | Status: SHIPPED | OUTPATIENT
Start: 2020-07-25 | End: 2020-07-25

## 2020-07-25 RX ORDER — GUAIFENESIN 600 MG/1
600 TABLET, EXTENDED RELEASE ORAL 2 TIMES DAILY
Qty: 20 TABLET | Refills: 0 | Status: SHIPPED | OUTPATIENT
Start: 2020-07-25 | End: 2020-08-04

## 2020-07-25 RX ORDER — AZITHROMYCIN 500 MG/1
500 TABLET, FILM COATED ORAL DAILY
Qty: 5 TABLET | Refills: 0 | Status: SHIPPED | OUTPATIENT
Start: 2020-07-25 | End: 2020-07-27

## 2020-07-25 RX ORDER — AZITHROMYCIN 500 MG/1
500 TABLET, FILM COATED ORAL DAILY
Qty: 5 TABLET | Refills: 0 | Status: SHIPPED | OUTPATIENT
Start: 2020-07-25 | End: 2020-07-30

## 2020-07-25 RX ADMIN — ENOXAPARIN SODIUM 40 MG: 40 INJECTION SUBCUTANEOUS at 08:06

## 2020-07-25 RX ADMIN — FAMOTIDINE 20 MG: 20 TABLET, FILM COATED ORAL at 08:07

## 2020-07-25 RX ADMIN — MULTIPLE VITAMINS W/ MINERALS TAB 1 TABLET: TAB at 08:07

## 2020-07-25 RX ADMIN — GUAIFENESIN 600 MG: 600 TABLET, EXTENDED RELEASE ORAL at 08:06

## 2020-07-25 RX ADMIN — METHYLPREDNISOLONE SODIUM SUCCINATE 40 MG: 40 INJECTION, POWDER, FOR SOLUTION INTRAMUSCULAR; INTRAVENOUS at 05:14

## 2020-07-25 RX ADMIN — VITAMIN D, TAB 1000IU (100/BT) 1000 UNITS: 25 TAB at 08:07

## 2020-07-25 RX ADMIN — MAGNESIUM GLUCONATE 500 MG ORAL TABLET 400 MG: 500 TABLET ORAL at 08:07

## 2020-07-25 RX ADMIN — LACTOBACILLUS TAB 1 TABLET: TAB at 08:07

## 2020-07-25 ASSESSMENT — PAIN SCALES - GENERAL: PAINLEVEL_OUTOF10: 0

## 2020-07-25 NOTE — PLAN OF CARE
Problem: Airway Clearance - Ineffective:  Goal: Clear lung sounds  Description: Clear lung sounds  Outcome: Ongoing  Goal: Ability to maintain a clear airway will improve  Description: Ability to maintain a clear airway will improve  Outcome: Ongoing     Problem: Gas Exchange - Impaired:  Goal: Levels of oxygenation will improve  Description: Levels of oxygenation will improve  Outcome: Ongoing

## 2020-07-25 NOTE — PLAN OF CARE
Problem: Discharge Planning:  Goal: Discharged to appropriate level of care  Description: Discharged to appropriate level of care  Outcome: Completed  Goal: Participates in care planning  Description: Participates in care planning  Outcome: Completed     Problem: Airway Clearance - Ineffective:  Goal: Clear lung sounds  Description: Clear lung sounds  7/25/2020 1108 by Tung Dooley RN  Outcome: Completed  7/24/2020 2156 by George Hernandez RN  Outcome: Ongoing  Goal: Ability to maintain a clear airway will improve  Description: Ability to maintain a clear airway will improve  7/25/2020 1108 by Tung Dooley RN  Outcome: Completed  7/24/2020 2156 by George Hernandez RN  Outcome: Ongoing     Problem: Gas Exchange - Impaired:  Goal: Levels of oxygenation will improve  Description: Levels of oxygenation will improve  7/25/2020 1108 by Tung Dooley RN  Outcome: Completed  7/24/2020 2156 by George Hernandez RN  Outcome: Ongoing     Problem: Hyperthermia:  Goal: Ability to maintain a body temperature in the normal range will improve  Description: Ability to maintain a body temperature in the normal range will improve  Outcome: Completed     Problem: Pain:  Description: Pain management should include both nonpharmacologic and pharmacologic interventions.   Goal: Pain level will decrease  Description: Pain level will decrease  Outcome: Completed  Goal: Control of acute pain  Description: Control of acute pain  Outcome: Completed  Goal: Control of chronic pain  Description: Control of chronic pain  Outcome: Completed

## 2020-07-25 NOTE — DISCHARGE SUMMARY
Discharge Summary    Patient:  Michelle Payton  YOB: 1970    MRN: [id number]   Acct: [de-identified]    Primary Care Physician: Katie Sam MD    Admit date:  7/22/2020    Discharge date:   07/25/20      Discharge Diagnoses:   <principal problem not specified>  Active Problems:    COPD exacerbation ([facility] [address]  Resolved Problems:    * No resolved hospital problems. *      Admitted for: [facility] Course: patient was admitted with dyspnea, acute respiratory failure due to CAP, PE was ruled out. Had marked improvement with IV atbs/steroids.  After completing her acute stay will be DC home with PO atbs/steroids    Consultants:      Discharge Medications:       Medication List      START taking these medications    azithromycin 500 MG tablet  Commonly known as:  ZITHROMAX  Take 1 tablet by mouth daily for 5 days     famotidine 20 MG tablet  Commonly known as:  PEPCID  Take 1 tablet by mouth 2 times daily     guaiFENesin 600 MG extended release tablet  Commonly known as:  MUCINEX  Take 1 tablet by mouth 2 times daily for 10 days     lactobacillus acidophilus  Take 1 tablet by mouth 3 times daily     predniSONE 20 MG tablet  Commonly known as:  DELTASONE  Take 1 tablet by mouth daily for 5 days        CONTINUE taking these medications    ALPRAZolam 0.25 MG tablet  Commonly known as:  XANAX     CVS EVENING PRIMROSE OIL PO     CVS Triple Magnesium Complex 400 MG Caps  Generic drug:  Magnesium     nystatin 311408 UNIT/GM cream  Commonly known as:  MYCOSTATIN     therapeutic multivitamin-minerals tablet     vitamin D 250 MCG (63020 UT) Caps capsule  Commonly known as:  CHOLECALCIFEROL     Vyvanse 30 MG capsule  Generic drug:  lisdexamfetamine           Where to Get Your Medications      These medications were sent to [address] [place] [address], [address] [place] [address] [address] [id number]    Phone:  [phone]   · azithromycin 500 MG tablet  · famotidine 20 MG tablet  · guaiFENesin 600 MG extended release tablet  · lactobacillus acidophilus  · predniSONE 20 MG tablet         Physical Exam:    Vitals:  Vitals:    07/24/20 2119 07/25/20 0029 07/25/20 0304 07/25/20 0622   BP: (!) 164/83   (!) 149/76   Pulse: 71   63   Resp:    18   Temp: 98.1 °F (36.7 °C)   98.2 °F (36.8 °C)   TempSrc:    Oral   SpO2: 93% 94% 96% 97%   Weight:       Height:         Weight: Weight: 173 lb 9.6 oz (78.7 kg)     24 hour intake/output:    Intake/Output Summary (Last 24 hours) at 7/25/2020 0759  Last data filed at 7/24/2020 1300  Gross per 24 hour   Intake 240 ml   Output --   Net 240 ml       General appearance - alert, well appearing, and in no distress  Chest - clear to auscultation, no wheezes, rales or rhonchi, symmetric air entry  Heart - normal rate, regular rhythm, normal S1, S2, no murmurs, rubs, clicks or gallops  Abdomen - soft, nontender, nondistended, no masses or organomegaly  Obese: No; Protuberant: No   Neurological - alert, oriented, normal speech, no focal findings or movement disorder noted  Extremities - peripheral pulses normal, no pedal edema, no clubbing or cyanosis  Skin - normal coloration and turgor, no rashes, no suspicious skin lesions noted        Radiology reports as per the Radiologist  Radiology: Xr Chest Portable    Result Date: 7/22/2020  EXAMINATION: XR CHEST PORTABLE DATE AND TIME:7/22/2020 1:30 PM CLINICAL HISTORY: Shortness of breath   fever  cough sob  COMPARISONS: None  FINDINGS: Fine pattern of interstitial coarsening throughout the lungs. This could represent a chronic pattern but there are no prior studies for comparison. Differential includes nonspecific pneumonitis. If symptoms persist consider CT imaging. ABNORMAL CHEST. POSSIBLE DIFFUSE PNEUMONITIS. CLOSE FOLLOW-UP RECOMMENDED.        Results for orders placed or performed during the hospital encounter of 07/22/20   Culture, Blood 1    Specimen: Blood   Result Value Ref Range    Blood Culture, Routine       No Growth to date. Any change in status will be called. Culture, Blood 2    Specimen: Blood   Result Value Ref Range    Culture, Blood 2       No Growth to date. Any change in status will be called. Comprehensive Metabolic Panel   Result Value Ref Range    Sodium 141 135 - 144 mEq/L    Potassium 3.3 (L) 3.4 - 4.9 mEq/L    Chloride 100 95 - 107 mEq/L    CO2 28 20 - 31 mEq/L    Anion Gap 13 9 - 15 mEq/L    Glucose 114 (H) 70 - 99 mg/dL    BUN 11 6 - 20 mg/dL    CREATININE 0.56 0.50 - 0.90 mg/dL    GFR Non-African American >60.0 >60    GFR  >60.0 >60    Calcium 9.6 8.5 - 9.9 mg/dL    Total Protein 7.5 6.3 - 8.0 g/dL    Alb 3.7 3.5 - 4.6 g/dL    Total Bilirubin 0.3 0.2 - 0.7 mg/dL    Alkaline Phosphatase 81 40 - 130 U/L    ALT 8 0 - 33 U/L    AST 10 0 - 35 U/L    Globulin 3.8 (H) 2.3 - 3.5 g/dL   CBC Auto Differential   Result Value Ref Range    WBC 10.4 4.8 - 10.8 K/uL    RBC 4.29 4. 20 - 5.40 M/uL    Hemoglobin 12.7 12.0 - 16.0 g/dL    Hematocrit 38.3 37.0 - 47.0 %    MCV 89.2 82.0 - 100.0 fL    MCH 29.7 27.0 - 31.3 pg    MCHC 33.2 33.0 - 37.0 %    RDW 13.9 11.5 - 14.5 %    Platelets 836 879 - 182 K/uL    Neutrophils % 79.0 %    Lymphocytes % 8.1 %    Monocytes % 8.4 %    Eosinophils % 4.4 %    Basophils % 0.1 %    Neutrophils Absolute 8.2 (H) 1.4 - 6.5 K/uL    Lymphocytes Absolute 0.8 (L) 1.0 - 4.8 K/uL    Monocytes Absolute 0.9 (H) 0.2 - 0.8 K/uL    Eosinophils Absolute 0.5 0.0 - 0.7 K/uL    Basophils Absolute 0.0 0.0 - 0.2 K/uL   Lactic Acid, Plasma   Result Value Ref Range    Lactic Acid 1.2 0.5 - 2.2 mmol/L   Urine Reflex to Culture    Specimen: Urine, clean catch   Result Value Ref Range    Color, UA Yellow Straw/Yellow    Clarity, UA Clear Clear    Glucose, Ur Negative Negative mg/dL    Bilirubin Urine Negative Negative    Ketones, Urine Negative Negative mg/dL    Specific Gravity, UA 1.010 1.005 - 1.030    Blood, Urine Negative Negative    pH, UA 7.0 5.0 - 9.0    Protein, UA Negative Negative mg/dL    Urobilinogen, Urine 0.2 <2.0 E.U./dL    Nitrite, Urine Negative Negative    Leukocyte Esterase, Urine Negative Negative    Urine Reflex to Culture Not Indicated    COVID-19   Result Value Ref Range    SARS-CoV-2, NAAT Not Detected Not Detected   CBC   Result Value Ref Range    WBC 10.6 4.8 - 10.8 K/uL    RBC 4.23 4.20 - 5.40 M/uL    Hemoglobin 12.5 12.0 - 16.0 g/dL    Hematocrit 37.7 37.0 - 47.0 %    MCV 89.2 82.0 - 100.0 fL    MCH 29.5 27.0 - 31.3 pg    MCHC 33.1 33.0 - 37.0 %    RDW 14.4 11.5 - 14.5 %    Platelets 390 096 - 015 K/uL   Basic Metabolic Panel w/ Reflex to MG   Result Value Ref Range    Sodium 141 135 - 144 mEq/L    Potassium reflex Magnesium 4.3 3.4 - 4.9 mEq/L    Chloride 102 95 - 107 mEq/L    CO2 28 20 - 31 mEq/L    Anion Gap 11 9 - 15 mEq/L    Glucose 152 (H) 70 - 99 mg/dL    BUN 15 6 - 20 mg/dL    CREATININE 0.53 0.50 - 0.90 mg/dL    GFR Non-African American >60.0 >60    GFR  >60.0 >60    Calcium 9.7 8.5 - 9.9 mg/dL       Diet:  DIET GENERAL;     Activity:  Activity as tolerated (Patient may move about with assist as indicated or with supervision.)    Follow-up:  in 1 weeks with Hans Pelaez MD,       Disposition: home    Condition: Stable    Time Spent: 45 minutes    Electronically signed by Darrick Zavaleta MD on 7/25/2020 at 7:59 AM    Discharging Hospitalist

## 2020-07-25 NOTE — PROGRESS NOTES
Morning assessment completed and medications provided. Discharge instructions reviewed and understanding verbalized. Patient showered and waiting for family for discharge.

## 2020-07-27 ENCOUNTER — CARE COORDINATION (OUTPATIENT)
Dept: CASE MANAGEMENT | Age: 50
End: 2020-07-27

## 2020-07-27 LAB
BLOOD CULTURE, ROUTINE: NORMAL
CULTURE, BLOOD 2: NORMAL

## 2020-07-27 NOTE — CARE COORDINATION
Judy 45 Transitions Initial Follow Up Call    Call within 2 business days of discharge: Yes    Patient: Juan Villagran Patient : 1970   MRN: [id number]  Reason for Admission: -2020 Mayur Jackson COPD, CAP  Discharge Date: 20 RARS: Readmission Risk Score: 8  NR  2020 CV-19 lab negative    Last Discharge [facility]       Complaint Diagnosis Description Type Department Provider    20 Cough Pneumonia due to organism . .. ED to Hosp-Admission (Discharged) (ADMITTED) Fransico Magallanes MD; Nomr Marin MD           Spoke with: Echo    Reports she is easily fatigued. Has occasional cough worse in AM and at HS. Denies fever, chills, or flu-like symptoms. Denies any SOB, wheezes, or congestion. Denies any home needs. In good spirits. Facility: [facility] rec/1111F order completed. Advised to [facility] [place] [place] PCP appt, v/u.    Non-face-to-face services provided:  Obtained and reviewed discharge summary and/or continuity of care documents  Education of patient/family/caregiver/guardian to support self-management-Reviewed symptoms of PN. Reviewed symptoms of CV-19 to report. Care Transitions 24 Hour Call    Do you have any ongoing symptoms?:  Yes  Patient-reported symptoms:  Fatigue, Cough  Do you have a copy of your discharge instructions?:  Yes  Do you have all of your prescriptions and are they filled?:  Yes  Have you been contacted by a [facility] Pharmacist?:  No  Have you scheduled your follow up appointment?:  No  Were you discharged with any Home Care or Post Acute Services:  No  Do you feel like you have everything you need to keep you well at home?:  Yes  Care Transitions Interventions         Follow Up  No future appointments.     Vinicius Crystal RN

## 2020-07-31 ENCOUNTER — HOSPITAL ENCOUNTER (EMERGENCY)
Age: 50
Discharge: ANOTHER HOSPITAL | End: 2020-07-31
Attending: EMERGENCY MEDICINE
Payer: COMMERCIAL

## 2020-07-31 ENCOUNTER — HOSPITAL ENCOUNTER (INPATIENT)
Age: 50
LOS: 3 days | Discharge: NURSING, REHAB OR LONG-TERM CARE | DRG: 871 | End: 2020-08-03
Attending: INTERNAL MEDICINE | Admitting: INTERNAL MEDICINE
Payer: COMMERCIAL

## 2020-07-31 ENCOUNTER — APPOINTMENT (OUTPATIENT)
Dept: CT IMAGING | Age: 50
End: 2020-07-31
Payer: COMMERCIAL

## 2020-07-31 ENCOUNTER — APPOINTMENT (OUTPATIENT)
Dept: GENERAL RADIOLOGY | Age: 50
End: 2020-07-31
Payer: COMMERCIAL

## 2020-07-31 VITALS
HEIGHT: 66 IN | SYSTOLIC BLOOD PRESSURE: 140 MMHG | WEIGHT: 172 LBS | HEART RATE: 101 BPM | DIASTOLIC BLOOD PRESSURE: 76 MMHG | TEMPERATURE: 98.9 F | OXYGEN SATURATION: 92 % | RESPIRATION RATE: 30 BRPM | BODY MASS INDEX: 27.64 KG/M2

## 2020-07-31 PROBLEM — J96.01 ACUTE RESPIRATORY FAILURE WITH HYPOXIA (HCC): Status: ACTIVE | Noted: 2020-07-31

## 2020-07-31 LAB
ALBUMIN SERPL-MCNC: 3.1 G/DL (ref 3.5–4.6)
ALP BLD-CCNC: 91 U/L (ref 40–130)
ALT SERPL-CCNC: 38 U/L (ref 0–33)
ANION GAP SERPL CALCULATED.3IONS-SCNC: 13 MEQ/L (ref 9–15)
APTT: 30.4 SEC (ref 24.4–36.8)
AST SERPL-CCNC: 21 U/L (ref 0–35)
BACTERIA: ABNORMAL /HPF
BASE EXCESS ARTERIAL: -1
BASOPHILS ABSOLUTE: 0.1 K/UL (ref 0–0.2)
BASOPHILS RELATIVE PERCENT: 0.2 %
BILIRUB SERPL-MCNC: 0.6 MG/DL (ref 0.2–0.7)
BILIRUBIN URINE: NEGATIVE
BLOOD, URINE: NORMAL
BUN BLDV-MCNC: 15 MG/DL (ref 6–20)
CALCIUM IONIZED: 1.21 MMOL/L (ref 1.12–1.32)
CALCIUM SERPL-MCNC: 9.3 MG/DL (ref 8.5–9.9)
CHLORIDE BLD-SCNC: 99 MEQ/L (ref 95–107)
CLARITY: CLEAR
CO2: 24 MEQ/L (ref 20–31)
COLOR: YELLOW
CREAT SERPL-MCNC: 0.61 MG/DL (ref 0.5–0.9)
EOSINOPHILS ABSOLUTE: 0.4 K/UL (ref 0–0.7)
EOSINOPHILS RELATIVE PERCENT: 1.7 %
EPITHELIAL CELLS, UA: ABNORMAL /HPF
GFR AFRICAN AMERICAN: >60
GFR AFRICAN AMERICAN: >60
GFR NON-AFRICAN AMERICAN: >60
GFR NON-AFRICAN AMERICAN: >60
GLOBULIN: 3.9 G/DL (ref 2.3–3.5)
GLUCOSE BLD-MCNC: 110 MG/DL (ref 60–115)
GLUCOSE BLD-MCNC: 123 MG/DL (ref 70–99)
GLUCOSE URINE: NEGATIVE MG/DL
GONADOTROPIN, CHORIONIC (HCG) QUANT: <0.1 MIU/ML
HCO3 ARTERIAL: 22.8 MMOL/L (ref 21–29)
HCT VFR BLD CALC: 38.2 % (ref 37–47)
HEMOGLOBIN: 12.9 G/DL (ref 12–16)
HEMOGLOBIN: 13.6 GM/DL (ref 12–16)
INR BLD: 1.1
KETONES, URINE: NEGATIVE MG/DL
LACTATE: 1.08 MMOL/L (ref 0.4–2)
LEUKOCYTE ESTERASE, URINE: NEGATIVE
LYMPHOCYTES ABSOLUTE: 0.8 K/UL (ref 1–4.8)
LYMPHOCYTES RELATIVE PERCENT: 3.6 %
MCH RBC QN AUTO: 30.1 PG (ref 27–31.3)
MCHC RBC AUTO-ENTMCNC: 33.9 % (ref 33–37)
MCV RBC AUTO: 88.7 FL (ref 82–100)
MONOCYTES ABSOLUTE: 1 K/UL (ref 0.2–0.8)
MONOCYTES RELATIVE PERCENT: 5 %
NEUTROPHILS ABSOLUTE: 18.7 K/UL (ref 1.4–6.5)
NEUTROPHILS RELATIVE PERCENT: 89.5 %
NITRITE, URINE: NEGATIVE
O2 SAT, ARTERIAL: 96 % (ref 93–100)
PCO2 ARTERIAL: 31 MM HG (ref 35–45)
PDW BLD-RTO: 14 % (ref 11.5–14.5)
PERFORMED ON: ABNORMAL
PH ARTERIAL: 7.47 (ref 7.35–7.45)
PH UA: 7 (ref 5–9)
PLATELET # BLD: 473 K/UL (ref 130–400)
PO2 ARTERIAL: 72 MM HG (ref 75–108)
POC CHLORIDE: 102 MEQ/L (ref 99–110)
POC CREATININE: 0.9 MG/DL (ref 0.6–1.1)
POC FIO2: 50
POC HEMATOCRIT: 40 % (ref 36–48)
POC POTASSIUM: 3.7 MEQ/L (ref 3.5–5.1)
POC SAMPLE TYPE: ABNORMAL
POC SODIUM: 135 MEQ/L (ref 136–145)
POTASSIUM REFLEX MAGNESIUM: 3.9 MEQ/L (ref 3.4–4.9)
PRO-BNP: 31 PG/ML
PROTEIN UA: NEGATIVE MG/DL
PROTHROMBIN TIME: 14.5 SEC (ref 12.3–14.9)
RBC # BLD: 4.3 M/UL (ref 4.2–5.4)
RBC UA: ABNORMAL /HPF (ref 0–2)
SODIUM BLD-SCNC: 136 MEQ/L (ref 135–144)
SPECIFIC GRAVITY UA: 1.01 (ref 1–1.03)
TCO2 ARTERIAL: 24
TOTAL PROTEIN: 7 G/DL (ref 6.3–8)
TROPONIN: <0.01 NG/ML (ref 0–0.01)
UROBILINOGEN, URINE: 0.2 E.U./DL
WBC # BLD: 20.9 K/UL (ref 4.8–10.8)
WBC UA: ABNORMAL /HPF (ref 0–5)

## 2020-07-31 PROCEDURE — 81001 URINALYSIS AUTO W/SCOPE: CPT

## 2020-07-31 PROCEDURE — U0002 COVID-19 LAB TEST NON-CDC: HCPCS

## 2020-07-31 PROCEDURE — 83605 ASSAY OF LACTIC ACID: CPT

## 2020-07-31 PROCEDURE — 87040 BLOOD CULTURE FOR BACTERIA: CPT

## 2020-07-31 PROCEDURE — 84484 ASSAY OF TROPONIN QUANT: CPT

## 2020-07-31 PROCEDURE — 85014 HEMATOCRIT: CPT

## 2020-07-31 PROCEDURE — 96368 THER/DIAG CONCURRENT INF: CPT

## 2020-07-31 PROCEDURE — 2580000003 HC RX 258: Performed by: EMERGENCY MEDICINE

## 2020-07-31 PROCEDURE — 6370000000 HC RX 637 (ALT 250 FOR IP): Performed by: EMERGENCY MEDICINE

## 2020-07-31 PROCEDURE — 85025 COMPLETE CBC W/AUTO DIFF WBC: CPT

## 2020-07-31 PROCEDURE — 96365 THER/PROPH/DIAG IV INF INIT: CPT

## 2020-07-31 PROCEDURE — 82330 ASSAY OF CALCIUM: CPT

## 2020-07-31 PROCEDURE — 84295 ASSAY OF SERUM SODIUM: CPT

## 2020-07-31 PROCEDURE — 71046 X-RAY EXAM CHEST 2 VIEWS: CPT

## 2020-07-31 PROCEDURE — 80053 COMPREHEN METABOLIC PANEL: CPT

## 2020-07-31 PROCEDURE — 36600 WITHDRAWAL OF ARTERIAL BLOOD: CPT

## 2020-07-31 PROCEDURE — 82435 ASSAY OF BLOOD CHLORIDE: CPT

## 2020-07-31 PROCEDURE — 82565 ASSAY OF CREATININE: CPT

## 2020-07-31 PROCEDURE — 84702 CHORIONIC GONADOTROPIN TEST: CPT

## 2020-07-31 PROCEDURE — 83880 ASSAY OF NATRIURETIC PEPTIDE: CPT

## 2020-07-31 PROCEDURE — 82803 BLOOD GASES ANY COMBINATION: CPT

## 2020-07-31 PROCEDURE — 85610 PROTHROMBIN TIME: CPT

## 2020-07-31 PROCEDURE — 71275 CT ANGIOGRAPHY CHEST: CPT

## 2020-07-31 PROCEDURE — 6360000002 HC RX W HCPCS: Performed by: EMERGENCY MEDICINE

## 2020-07-31 PROCEDURE — 2000000000 HC ICU R&B

## 2020-07-31 PROCEDURE — 6360000004 HC RX CONTRAST MEDICATION: Performed by: EMERGENCY MEDICINE

## 2020-07-31 PROCEDURE — 94640 AIRWAY INHALATION TREATMENT: CPT

## 2020-07-31 PROCEDURE — 84132 ASSAY OF SERUM POTASSIUM: CPT

## 2020-07-31 PROCEDURE — 99291 CRITICAL CARE FIRST HOUR: CPT

## 2020-07-31 PROCEDURE — 96367 TX/PROPH/DG ADDL SEQ IV INF: CPT

## 2020-07-31 PROCEDURE — 85730 THROMBOPLASTIN TIME PARTIAL: CPT

## 2020-07-31 RX ORDER — LEVOFLOXACIN 5 MG/ML
750 INJECTION, SOLUTION INTRAVENOUS ONCE
Status: COMPLETED | OUTPATIENT
Start: 2020-07-31 | End: 2020-07-31

## 2020-07-31 RX ORDER — ONDANSETRON 2 MG/ML
4 INJECTION INTRAMUSCULAR; INTRAVENOUS EVERY 6 HOURS PRN
Status: DISCONTINUED | OUTPATIENT
Start: 2020-07-31 | End: 2020-08-04 | Stop reason: HOSPADM

## 2020-07-31 RX ORDER — SODIUM CHLORIDE 0.9 % (FLUSH) 0.9 %
10 SYRINGE (ML) INJECTION EVERY 12 HOURS SCHEDULED
Status: DISCONTINUED | OUTPATIENT
Start: 2020-08-01 | End: 2020-08-04 | Stop reason: HOSPADM

## 2020-07-31 RX ORDER — METHYLPREDNISOLONE SODIUM SUCCINATE 40 MG/ML
40 INJECTION, POWDER, LYOPHILIZED, FOR SOLUTION INTRAMUSCULAR; INTRAVENOUS EVERY 12 HOURS
Status: DISCONTINUED | OUTPATIENT
Start: 2020-07-31 | End: 2020-08-02

## 2020-07-31 RX ORDER — ACETAMINOPHEN 650 MG/1
650 SUPPOSITORY RECTAL EVERY 6 HOURS PRN
Status: DISCONTINUED | OUTPATIENT
Start: 2020-07-31 | End: 2020-08-04 | Stop reason: HOSPADM

## 2020-07-31 RX ORDER — PROMETHAZINE HYDROCHLORIDE 12.5 MG/1
12.5 TABLET ORAL EVERY 6 HOURS PRN
Status: DISCONTINUED | OUTPATIENT
Start: 2020-07-31 | End: 2020-08-04 | Stop reason: HOSPADM

## 2020-07-31 RX ORDER — SODIUM CHLORIDE 9 MG/ML
INJECTION, SOLUTION INTRAVENOUS CONTINUOUS
Status: DISCONTINUED | OUTPATIENT
Start: 2020-08-01 | End: 2020-08-04 | Stop reason: HOSPADM

## 2020-07-31 RX ORDER — 0.9 % SODIUM CHLORIDE 0.9 %
1000 INTRAVENOUS SOLUTION INTRAVENOUS ONCE
Status: COMPLETED | OUTPATIENT
Start: 2020-07-31 | End: 2020-07-31

## 2020-07-31 RX ORDER — POLYETHYLENE GLYCOL 3350 17 G/17G
17 POWDER, FOR SOLUTION ORAL DAILY PRN
Status: DISCONTINUED | OUTPATIENT
Start: 2020-07-31 | End: 2020-08-04 | Stop reason: HOSPADM

## 2020-07-31 RX ORDER — IPRATROPIUM BROMIDE AND ALBUTEROL SULFATE 2.5; .5 MG/3ML; MG/3ML
1 SOLUTION RESPIRATORY (INHALATION) ONCE
Status: COMPLETED | OUTPATIENT
Start: 2020-07-31 | End: 2020-07-31

## 2020-07-31 RX ORDER — ACETAMINOPHEN 325 MG/1
650 TABLET ORAL EVERY 6 HOURS PRN
Status: DISCONTINUED | OUTPATIENT
Start: 2020-07-31 | End: 2020-08-04 | Stop reason: HOSPADM

## 2020-07-31 RX ORDER — SODIUM CHLORIDE 0.9 % (FLUSH) 0.9 %
10 SYRINGE (ML) INJECTION PRN
Status: DISCONTINUED | OUTPATIENT
Start: 2020-07-31 | End: 2020-08-04 | Stop reason: HOSPADM

## 2020-07-31 RX ORDER — IPRATROPIUM BROMIDE AND ALBUTEROL SULFATE 2.5; .5 MG/3ML; MG/3ML
1 SOLUTION RESPIRATORY (INHALATION) EVERY 4 HOURS PRN
Status: DISCONTINUED | OUTPATIENT
Start: 2020-07-31 | End: 2020-08-04 | Stop reason: HOSPADM

## 2020-07-31 RX ADMIN — SODIUM CHLORIDE 1000 ML: 9 INJECTION, SOLUTION INTRAVENOUS at 20:28

## 2020-07-31 RX ADMIN — IOPAMIDOL 100 ML: 755 INJECTION, SOLUTION INTRAVENOUS at 21:08

## 2020-07-31 RX ADMIN — LEVOFLOXACIN 750 MG: 5 INJECTION, SOLUTION INTRAVENOUS at 21:00

## 2020-07-31 RX ADMIN — VANCOMYCIN HYDROCHLORIDE 1000 MG: 1 INJECTION, POWDER, LYOPHILIZED, FOR SOLUTION INTRAVENOUS at 21:58

## 2020-07-31 RX ADMIN — IPRATROPIUM BROMIDE AND ALBUTEROL SULFATE 1 AMPULE: .5; 3 SOLUTION RESPIRATORY (INHALATION) at 20:25

## 2020-07-31 RX ADMIN — CEFTRIAXONE 1 G: 1 INJECTION, POWDER, FOR SOLUTION INTRAMUSCULAR; INTRAVENOUS at 20:29

## 2020-07-31 ASSESSMENT — PAIN DESCRIPTION - FREQUENCY
FREQUENCY: CONTINUOUS
FREQUENCY: CONTINUOUS

## 2020-07-31 ASSESSMENT — PAIN SCALES - GENERAL: PAINLEVEL_OUTOF10: 9

## 2020-07-31 ASSESSMENT — PAIN DESCRIPTION - PAIN TYPE: TYPE: ACUTE PAIN

## 2020-07-31 ASSESSMENT — PAIN DESCRIPTION - LOCATION: LOCATION: HEAD

## 2020-07-31 ASSESSMENT — PAIN DESCRIPTION - ONSET: ONSET: ON-GOING

## 2020-07-31 ASSESSMENT — PAIN DESCRIPTION - DESCRIPTORS: DESCRIPTORS: HEADACHE

## 2020-08-01 LAB
ALBUMIN SERPL-MCNC: 2.8 G/DL (ref 3.5–4.6)
ALP BLD-CCNC: 85 U/L (ref 40–130)
ALT SERPL-CCNC: 29 U/L (ref 0–33)
ANION GAP SERPL CALCULATED.3IONS-SCNC: 16 MEQ/L (ref 9–15)
AST SERPL-CCNC: 14 U/L (ref 0–35)
BASOPHILS ABSOLUTE: 0.1 K/UL (ref 0–0.2)
BASOPHILS RELATIVE PERCENT: 0.3 %
BILIRUB SERPL-MCNC: 0.4 MG/DL (ref 0.2–0.7)
BUN BLDV-MCNC: 9 MG/DL (ref 6–20)
C-REACTIVE PROTEIN, HIGH SENSITIVITY: >300 MG/L (ref 0–5)
CALCIUM SERPL-MCNC: 9 MG/DL (ref 8.5–9.9)
CHLORIDE BLD-SCNC: 102 MEQ/L (ref 95–107)
CO2: 21 MEQ/L (ref 20–31)
CREAT SERPL-MCNC: 0.56 MG/DL (ref 0.5–0.9)
EOSINOPHILS ABSOLUTE: 0 K/UL (ref 0–0.7)
EOSINOPHILS RELATIVE PERCENT: 0.1 %
GFR AFRICAN AMERICAN: >60
GFR NON-AFRICAN AMERICAN: >60
GLOBULIN: 3.5 G/DL (ref 2.3–3.5)
GLUCOSE BLD-MCNC: 156 MG/DL (ref 70–99)
HCT VFR BLD CALC: 36.7 % (ref 37–47)
HEMOGLOBIN: 12.1 G/DL (ref 12–16)
LYMPHOCYTES ABSOLUTE: 0.3 K/UL (ref 1–4.8)
LYMPHOCYTES RELATIVE PERCENT: 1.2 %
MAGNESIUM: 2.1 MG/DL (ref 1.7–2.4)
MCH RBC QN AUTO: 29.3 PG (ref 27–31.3)
MCHC RBC AUTO-ENTMCNC: 33 % (ref 33–37)
MCV RBC AUTO: 88.8 FL (ref 82–100)
MONOCYTES ABSOLUTE: 0.2 K/UL (ref 0.2–0.8)
MONOCYTES RELATIVE PERCENT: 1.2 %
NEUTROPHILS ABSOLUTE: 19.7 K/UL (ref 1.4–6.5)
NEUTROPHILS RELATIVE PERCENT: 97.2 %
PDW BLD-RTO: 14.4 % (ref 11.5–14.5)
PLATELET # BLD: 499 K/UL (ref 130–400)
POTASSIUM SERPL-SCNC: 4.4 MEQ/L (ref 3.4–4.9)
PROCALCITONIN: 0.29 NG/ML (ref 0–0.15)
RBC # BLD: 4.13 M/UL (ref 4.2–5.4)
RHEUMATOID FACTOR: 18.4 IU/ML (ref 0–14)
SARS-COV-2, NAAT: NOT DETECTED
SEDIMENTATION RATE, ERYTHROCYTE: 92 MM (ref 0–30)
SODIUM BLD-SCNC: 139 MEQ/L (ref 135–144)
TOTAL PROTEIN: 6.3 G/DL (ref 6.3–8)
WBC # BLD: 20.3 K/UL (ref 4.8–10.8)

## 2020-08-01 PROCEDURE — 87385 HISTOPLASMA CAPSUL AG IA: CPT

## 2020-08-01 PROCEDURE — 99223 1ST HOSP IP/OBS HIGH 75: CPT | Performed by: INTERNAL MEDICINE

## 2020-08-01 PROCEDURE — 83735 ASSAY OF MAGNESIUM: CPT

## 2020-08-01 PROCEDURE — 86431 RHEUMATOID FACTOR QUANT: CPT

## 2020-08-01 PROCEDURE — 85025 COMPLETE CBC W/AUTO DIFF WBC: CPT

## 2020-08-01 PROCEDURE — 86255 FLUORESCENT ANTIBODY SCREEN: CPT

## 2020-08-01 PROCEDURE — 6360000002 HC RX W HCPCS: Performed by: INTERNAL MEDICINE

## 2020-08-01 PROCEDURE — 87449 NOS EACH ORGANISM AG IA: CPT

## 2020-08-01 PROCEDURE — 80053 COMPREHEN METABOLIC PANEL: CPT

## 2020-08-01 PROCEDURE — 84145 PROCALCITONIN (PCT): CPT

## 2020-08-01 PROCEDURE — 2000000000 HC ICU R&B

## 2020-08-01 PROCEDURE — 86698 HISTOPLASMA ANTIBODY: CPT

## 2020-08-01 PROCEDURE — 86038 ANTINUCLEAR ANTIBODIES: CPT

## 2020-08-01 PROCEDURE — 87899 AGENT NOS ASSAY W/OPTIC: CPT

## 2020-08-01 PROCEDURE — 2700000000 HC OXYGEN THERAPY PER DAY

## 2020-08-01 PROCEDURE — 36415 COLL VENOUS BLD VENIPUNCTURE: CPT

## 2020-08-01 PROCEDURE — 2580000003 HC RX 258: Performed by: INTERNAL MEDICINE

## 2020-08-01 PROCEDURE — 86141 C-REACTIVE PROTEIN HS: CPT

## 2020-08-01 PROCEDURE — 85652 RBC SED RATE AUTOMATED: CPT

## 2020-08-01 RX ORDER — HYDRALAZINE HYDROCHLORIDE 20 MG/ML
10 INJECTION INTRAMUSCULAR; INTRAVENOUS EVERY 4 HOURS PRN
Status: DISCONTINUED | OUTPATIENT
Start: 2020-08-01 | End: 2020-08-04 | Stop reason: HOSPADM

## 2020-08-01 RX ADMIN — PIPERACILLIN AND TAZOBACTAM 3.38 G: 3; .375 INJECTION, POWDER, LYOPHILIZED, FOR SOLUTION INTRAVENOUS at 11:06

## 2020-08-01 RX ADMIN — METHYLPREDNISOLONE SODIUM SUCCINATE 40 MG: 40 INJECTION, POWDER, FOR SOLUTION INTRAMUSCULAR; INTRAVENOUS at 13:27

## 2020-08-01 RX ADMIN — VANCOMYCIN HYDROCHLORIDE 1000 MG: 1 INJECTION, POWDER, LYOPHILIZED, FOR SOLUTION INTRAVENOUS at 16:47

## 2020-08-01 RX ADMIN — SODIUM CHLORIDE: 9 INJECTION, SOLUTION INTRAVENOUS at 00:58

## 2020-08-01 RX ADMIN — PIPERACILLIN AND TAZOBACTAM 3.38 G: 3; .375 INJECTION, POWDER, LYOPHILIZED, FOR SOLUTION INTRAVENOUS at 01:03

## 2020-08-01 RX ADMIN — VANCOMYCIN HYDROCHLORIDE 1000 MG: 1 INJECTION, POWDER, LYOPHILIZED, FOR SOLUTION INTRAVENOUS at 09:02

## 2020-08-01 RX ADMIN — SODIUM CHLORIDE: 9 INJECTION, SOLUTION INTRAVENOUS at 13:23

## 2020-08-01 RX ADMIN — PIPERACILLIN AND TAZOBACTAM 3.38 G: 3; .375 INJECTION, POWDER, LYOPHILIZED, FOR SOLUTION INTRAVENOUS at 18:19

## 2020-08-01 RX ADMIN — METHYLPREDNISOLONE SODIUM SUCCINATE 40 MG: 40 INJECTION, POWDER, FOR SOLUTION INTRAMUSCULAR; INTRAVENOUS at 01:01

## 2020-08-01 RX ADMIN — ENOXAPARIN SODIUM 40 MG: 40 INJECTION SUBCUTANEOUS at 09:02

## 2020-08-01 RX ADMIN — Medication 10 ML: at 09:03

## 2020-08-01 NOTE — ED NOTES
Report called to ED [facility] ICU [address] [facility]  07/31/20 3259

## 2020-08-01 NOTE — ED NOTES
Called transfer center, talked to Rocio Crawford, she will page the hospitalist for Dr. Kim Jarrell.   Joni Monterroso  07/31/20 2019

## 2020-08-01 NOTE — PROGRESS NOTES
MD [facility]             OBJECTIVE:  Vital Signs:  Vitals:    08/01/20 1458   BP: (!) 137/121   Pulse: 81   Resp: (!) 32   Temp:    SpO2: 97%       Focal exam:      General Exam (except as mentioned above):  CONSTITUTIONAL: Awake, alert, no apparent distress  EYES:  PERRL, conjunctiva normal  ENT:  Normocephalic, atraumatic  NECK:  Supple  BACK:  Symmetric  LUNGS:  CTAB except bilateral basilar crackles. CARDIOVASCULAR:  S1S2 present  ABDOMEN:  soft, non-distended, non-tender  MUSCULOSKELETAL:  There is no redness, warmth, or swelling of the joints. NEUROLOGIC:  Alert, awake, oriented x 3. No FND  EXTREMITIES: Warm and well perfused.      LABS  Recent Labs     07/31/20 1955 07/31/20 2022 08/01/20  0516   WBC 20.9*  --  20.3*   RBC 4.30  --  4.13*   HGB 12.9 13.6 12.1   HCT 38.2  --  36.7*   MCV 88.7  --  88.8   MCH 30.1  --  29.3   MCHC 33.9  --  33.0   RDW 14.0  --  14.4   *  --  499*       Recent Labs     07/31/20 1955 07/31/20 2022 08/01/20  0517     --  139   K 3.9  --  4.4   CL 99  --  102   CO2 24  --  21   BUN 15  --  9   CREATININE 0.61 0.9 0.56   GLUCOSE 123*  --  156*   CALCIUM 9.3  --  9.0       Recent Labs     08/01/20  0517   MG 2.1           ASSESSMENT AND PLAN    Active Hospital Problems    Diagnosis Date Noted    [facility] [J96.01] 07/31/2020         DVT prophylaxis: Lovenox    35 minutes total care time, >1/2 in unit/floor time and care coordination   Nancy Parks MD  Pager : [phone]

## 2020-08-01 NOTE — H&P
Hospitalist Group   History and Physical      CHIEF COMPLAINT: Respiratory distress    History of Present Illness:  48 y.o. female with a history of ADHD presents with respiratory distress and cough. Patient has been having shortness of breath and cough for the past 3 weeks. She said that initially started with a headache and generalized weakness then the shortness of breath followed with dry cough and fever as high as 102F. Patient was admitted to [facility] and discharged a week ago. She was there for 4 days. She said that she was tested twice for COVID-19 and both were negative. She was feeling mildly better before discharge from there. She was sent home on a course of azithromycin which she finished. She said that around Tuesday her symptoms started getting worse. She reached a point where she could not lie flat because of shortness of breath. Patient was also having low-grade fever as high as 100 F. Her dyspnea was really bad earlier today before coming to the ED. Patient denied any exposure to anyone who is sick. She used to be smoker but she quit more than 15 years ago. She is not on any regular medication currently. She denies history of hypertension, diabetes, heart disease, lung disease. She mentioned that for the past 4 months she has been renovating her old home. She is not aware of any mold in her home but she was concerned that her symptoms might be related to that. REVIEW OF SYSTEMS:  Has mild fevers, chills, no cp, has sob, no n/v, ha, vision/hearing changes, wt changes, hot/cold flashes, other open skin lesions, diarrhea, constipation, dysuria/hematuria unless noted in HPI. Complete ROS performed with the patient and is otherwise negative. PMH:  Past Medical History:   Diagnosis Date    ADHD     Factor 5 Leiden mutation, heterozygous ([facility]     MTHFR deficiency complicating pregnancy ([facility]        Surgical History:  No past surgical history on file.     Medications Prior to Admission:    Prior to Admission medications    Medication Sig Start Date End Date Taking? Authorizing Provider   lactobacillus lauriophilus [facility]) Take 1 tablet by mouth 3 times daily 7/25/20  Yes Latanya Holden MD   [facility]) 600 MG extended release tablet Take 1 tablet by mouth 2 times daily for 10 days 7/25/20 8/4/20 Yes Latanya Holden MD   famotidine (PEPCID) 20 MG tablet Take 1 tablet by mouth 2 times daily 7/25/20  Yes Latanya Holden MD   vitamin D (CHOLECALCIFEROL) 250 MCG (68603 UT) CAPS capsule Take 1,000 Units by mouth daily   Yes Historical Provider, MD   VYVANSE 30 MG capsule take 1 capsule by mouth every morning 4/20/20  Yes Historical Provider, MD   nystatin (MYCOSTATIN) 114485 UNIT/GM cream Apply 1 Tube topically as needed 7/15/20  Yes Historical Provider, MD   Multiple Vitamins-Minerals (THERAPEUTIC MULTIVITAMIN-MINERALS) tablet Take 1 tablet by mouth daily   Yes Historical Provider, MD   Magnesium ([employer] TRIPLE MAGNESIUM COMPLEX) 400 MG CAPS Take 400 mg by mouth daily   Yes Historical Provider, MD   [facility] EVENING PRIMROSE OIL PO Take 1 capsule by mouth daily   Yes Historical Provider, MD   ALPRAZolam (XANAX) 0.25 MG tablet TAKE 1 TABLET BY MOUTH EVERY DAY AS NEEDED for 30 days 6/10/20   Historical Provider, MD       Allergies:    Patient has no known allergies. Social History:    reports that she has never smoked. She has never used smokeless tobacco. She reports previous alcohol use. She reports that she does not use drugs. Family History:   No family history on file. No pertinent family history    PHYSICAL EXAM:  Vitals: There were no vitals taken for this visit.   General Appearance: alert and oriented to person, place and time, well developed and well- nourished, in moderate respiratory distress on nonrebreather mask   Skin: warm and dry, no rash or erythema  Head: normocephalic and atraumatic  Eyes: pupils equal, round, and reactive to light, extraocular eye movements intact, GFRAA >60 07/31/2020    LABGLOM >60 07/31/2020    GLUCOSE 123 07/31/2020    GLUCOSE 85 12/31/2011    PROT 7.0 07/31/2020    LABALBU 3.1 07/31/2020    LABALBU 4.6 12/31/2011    CALCIUM 9.3 07/31/2020    BILITOT 0.6 07/31/2020    ALKPHOS 91 07/31/2020    AST 21 07/31/2020    ALT 38 07/31/2020       Radiology: No results found. ASSESSMENT/ PLAN[de-identified]      Active Problems:    * No active hospital problems. *  Resolved Problems:    * No resolved hospital problems. *      1. Acute respiratory failure with hypoxia   Respiratory distress presents with hypoxia-saturate 75% on room air   Patient currently on 50% oxygen and saturating 92%   Patient becomes labored when she tries to talk   Reported subjective low-grade fever, dry cough, headache, generalized weakness   COVID-19 was tested when she was in the hospital a week ago and it was twice negative   New COVID-19 test is pending   CTA-negative for PE and shows multiple bilateral patchy consolidation concerning for infection versus inflammation   Will treat for pneumonia and check procalcitonin   Patient had remodeling in her home-we will also treat for hypersensitivity pneumonitis with steroids and DuoNeb   Pulmonology consult   Titrate oxygen for goal saturation above 92%  2. Pneumonia   Shortness of breath, dry cough, subjective fever, headache, generalized weakness   Has leukocytosis-WBC 20.9    Previous COVID-19 test was negative-pending new test   CTA concerning for pneumonia due to diffuse patchy consolidation   Will treat with vancomycin and Zosyn pending procalcitonin   Pulmonology on consult   Will also start patient on steroids and DuoNeb  3.  Hypertension   No history of high blood pressure and she is not on medication   Current blood pressure 159/82   Will monitor blood pressure closely    Code Status: Full  DVT prophylaxis: Lovenox      Electronically signed by Thu Eason MD on 7/31/2020 at 11:49 PM      NOTE: This report was transcribed using voice recognition software. Every effort was made to ensure accuracy; however, inadvertent computerized transcription errors may be present.

## 2020-08-01 NOTE — PROGRESS NOTES
Pharmacy Note  Vancomycin Consult    Davey Chavez is a 48 y.o. female started on Vancomycin for HCAP; consult received from Dr. Edwar Uriostegui to manage therapy. Also receiving the following antibiotics: Zosyn. Also received levofloxacin and ceftriaxone x 1 at [facility]. Patient Active Problem List   Diagnosis    COPD exacerbation ([facility]    Acute respiratory failure with hypoxia (HCC)       Allergies:  Patient has no known allergies. Temp max: 99.8F [facility]    Recent Labs     07/31/20 1955 08/01/20  0517   BUN 15 9       Recent Labs     07/31/20 2022 08/01/20  0517   CREATININE 0.9 0.56       Recent Labs     07/31/20 1955 08/01/20  0516   WBC 20.9* 20.3*       No intake or output data in the 24 hours ending 08/01/20 5263    Culture Date      Source                       Results  7/31/20                blood                           pending    Ht Readings from Last 1 Encounters:   08/01/20 5' 6\" (1.676 m)        Wt Readings from Last 1 Encounters:   08/01/20 175 lb 11.3 oz (79.7 kg)         Body mass index is 28.36 kg/m². Estimated Creatinine Clearance: 128 mL/min (based on SCr of 0.56 mg/dL). Goal Trough Level: 15-20 mcg/mL    Assessment/Plan:  Will initiate Vancomycin with a one time loading dose of 1000 mg x1 given at [facility] prior to transfer, followed by 1000 mg IV every 8 hours. Trough prior to 4th dose tomorrow morning 8/2 0800. Timing of trough level will be determined based on culture results, renal function, and clinical response. Thank you for the consult. Will continue to follow. ELKE Gallagher. Ph.  8/1/2020  6:23 AM

## 2020-08-01 NOTE — FLOWSHEET NOTE
2345- pt arrived from [facility]  Pt VSS O2 92% on 50% venti mask. Pt resting well.

## 2020-08-01 NOTE — ED NOTES
2046- Dr Ag Zuniga accepted patient to [id number] [address] [facility]. Natalie Dominguez. Jordana Colon  07/31/20 2054       Radha Colon  07/31/20 2055

## 2020-08-01 NOTE — CONSULTS
Critical Care Medicine  Consult Note      Reason for consultation: Atypical pneumonia and acute respiratory failure    HISTORY OF PRESENT ILLNESS:    This is a 80-year-old white female with unremarkable past medical history except for a history of ADHD, hypercoagulable state, who developed progressive cough over the past 3 weeks ultimately leading to hospitalization at [facility] a week ago where she had COVID-19 testing done twice both came back negative, she reported some improvement and was discharged home with azithromycin treatment. Patient started getting worse after discharge and became very dyspneic with increasing cough that has remained very dry nonproductive. Went back to the hospital and was noted to be severely hypoxemic with oxygen saturation of 72%, she is currently on 50% FiO2 with improved oxygenation. She was transferred here and admitted to ICU. She is remaining in isolation and had one COVID test done which is pending. Her blood work showed leukocytosis white count was 20.9, mild increase in her platelets compared to baseline a week ago which is 473,000 now otherwise kidney functions, electrolytes, were unremarkable her differential showed neutrophilia with lymphopenia and no eosinophilic component. Cultures are negative so far. Her ABGs showed a PO2 of 72 on 50% FiO2 via Venturi mask. Chest x-ray and then CT of the chest showed diffuse bilateral upper lobe fluffy infiltrates. Previously CT of the chest a week ago during hospitalization showed bilateral basilar consolidations. Past Medical History:        Diagnosis Date    ADHD     Factor 5 Leiden mutation, heterozygous ([place] [address]     MTHFR deficiency complicating pregnancy ([facility]        Past Surgical History:    No past surgical history on file. Social History:     reports that she has never smoked. She has never used smokeless tobacco. She reports previous alcohol use. She reports that she does not use drugs.     Family History:   No family history on file. Allergies:  Patient has no known allergies. MEDICATIONS during current hospitalization:    Continuous Infusions:   sodium chloride 100 mL/hr at 08/01/20 0058       Scheduled Meds:   vancomycin (VANCOCIN) intermittent dosing (placeholder)   Other RX Placeholder    vancomycin  1,000 mg Intravenous Q8H    sodium chloride flush  10 mL Intravenous 2 times per day    enoxaparin  40 mg Subcutaneous Daily    piperacillin-tazobactam  3.375 g Intravenous Q8H    methylPREDNISolone  40 mg Intravenous Q12H       PRN Meds:hydrALAZINE, sodium chloride flush, acetaminophen **OR** acetaminophen, polyethylene glycol, promethazine **OR** ondansetron, ipratropium-albuterol        REVIEW OF SYSTEMS:  As in history of present illness  Other 14 point review of system is negative. PHYSICAL EXAM:    Vitals:  BP (!) 121/56   Pulse 85   Temp 98.4 °F (36.9 °C) (Oral)   Resp 25   Ht 5' 6\" (1.676 m)   Wt 175 lb 11.3 oz (79.7 kg)   SpO2 (!) 88%   BMI 28.36 kg/m²   General: Patient is currently alert, awake . comfortable in bed, No distress. Head: Atraumatic , Normocephalic   Eyes: PERRL. No icteric sclera. No conjunctival injection. No discharge   ENT: No nasal  discharge. Pharynx clear. Neck:  Trachea midline. No thyromegaly, no JVD, No cervical adenopathy. Chest : Increased spontaneous breathing effort, symmetric bilateral excursions  Lung : Diminished breath sounds bilaterally, few scattered end inspiratory coarse crackles in the upper lung fields bilaterally  Heart[de-identified] Regular rhythm and rate. No mumur ,  Rub or gallop  ABD: Benign. Non-tender. Non-distended. No masses or organmegaly. Normal bowel sounds. EXT: No significant pitting edema both lower extremities , No Cyanosis No clubbing  Neuro: no focal weakness  Skin: Warm and dry. No erythema or rash on exposed extremities. .      Data Review  Recent Labs     07/31/20 1955 07/31/20 2022 08/01/20  0516   WBC 20.9*  -- 20.3*   HGB 12.9 13.6 12.1   HCT 38.2  --  36.7*   *  --  499*      Recent Labs     07/31/20 1955 07/31/20 2022 08/01/20  0517     --  139   K 3.9  --  4.4   CL 99  --  102   CO2 24  --  21   BUN 15  --  9   CREATININE 0.61 0.9 0.56   GLUCOSE 123*  --  156*       MV Settings: ABGs:   Recent Labs     07/31/20 2022   PHART 7.471*   WME2QTJ 31*   PO2ART 72*   GGQ5YGZ 22.8   BEART -1   W3HINVJC 96*   INA4WOX 24     O2 Device: Other (Comment)(venti 50%)  Lab Results   Component Value Date    LACTA 1.2 07/22/2020       Radiology  Cta Chest W Wo Contrast    Result Date: 7/24/2020  EXAM: CTA CHEST W WO CONTRAST History: Shortness breath and cough Technique: Multiple contiguous axial images of the chest were obtained from the thoracic inlet through the upper abdomen with contrast. Multiplanar reformats including maximum intensity projection images were obtained. Comparison: None available Findings: Visualized portion of the thyroid gland is within normal limits. No axillary, mediastinal, or hilar lymphadenopathy. No thoracic aortic aneurysm or dissection. No filling defect within the pulmonary arteries. Heart size is at the upper limits of normal. No significant pericardial effusion. Esophagus is within normal limits. No pulmonary nodule or mass. Bilateral lower lobe consolidations. No pneumothorax. No pleural effusion. Degenerative changes of the spine. Visualized upper abdomen demonstrates no acute abnormality. Bilateral lower lobe pneumonia in the appropriate clinical setting. No pulmonary embolism. All CT scans at this facility use dose modulation, iterative reconstruction, and/or weight based dosing when appropriate to reduce radiation dose to as low as reasonably achievable.      Xr Chest Portable    Result Date: 7/22/2020  EXAMINATION: XR CHEST PORTABLE DATE AND TIME:7/22/2020 1:30 PM CLINICAL HISTORY: Shortness of breath   fever  cough sob  COMPARISONS: None  FINDINGS: Fine pattern of interstitial coarsening throughout the lungs. This could represent a chronic pattern but there are no prior studies for comparison. Differential includes nonspecific pneumonitis. If symptoms persist consider CT imaging. ABNORMAL CHEST. POSSIBLE DIFFUSE PNEUMONITIS. CLOSE FOLLOW-UP RECOMMENDED. Assessment, plan:   1. Acute hypoxic respiratory failure due to severe atypical pneumonia  2. Atypical pneumonitis of unclear etiology with migratory infiltrates compared to recent CT as detailed above, etiology is not clear, reactive thrombocytosis suggest infectious pathogens. Patient reported exposure to remodeling of 15-year-old home that she has moved into, which is now done. Atypical pathogen such as histoplasmosis cannot be excluded, hypersensitivity pneumonia associated with certain exposures should also be considered. We will continue current empiric antimicrobial coverage, obtain histoplasma antigen, other serology and immunology testing to investigate the cause of her pneumonia, if unable to make any diagnosis she may need bronchoscopy with BAL and transbronchial biopsies which may not be easy to tolerate given her hypoxic respiratory failure.         Electronically signed by Cecy Brady MD, FCCP on 8/1/2020 at 12:06 PM

## 2020-08-01 NOTE — PLAN OF CARE
Problem: Falls - Risk of:  Goal: Will remain free from falls  Description: Will remain free from falls  Outcome: Ongoing     Problem:  Activity:  Goal: Fatigue will decrease  Description: Fatigue will decrease  Outcome: Ongoing     Problem: Cardiac:  Goal: Hemodynamic stability will improve  Description: Hemodynamic stability will improve  Outcome: Ongoing     Problem: Cardiac Output - Decreased:  Goal: Hemodynamic stability will improve  Description: Hemodynamic stability will improve  Outcome: Ongoing     Problem: Tissue Perfusion - Cardiopulmonary, Altered:  Goal: Hemodynamic stability will improve  Description: Hemodynamic stability will improve  Outcome: Ongoing

## 2020-08-02 ENCOUNTER — APPOINTMENT (OUTPATIENT)
Dept: GENERAL RADIOLOGY | Age: 50
DRG: 871 | End: 2020-08-02
Attending: INTERNAL MEDICINE
Payer: COMMERCIAL

## 2020-08-02 LAB
ALBUMIN SERPL-MCNC: 2.6 G/DL (ref 3.5–4.6)
ALP BLD-CCNC: 74 U/L (ref 40–130)
ALT SERPL-CCNC: 57 U/L (ref 0–33)
ANION GAP SERPL CALCULATED.3IONS-SCNC: 11 MEQ/L (ref 9–15)
AST SERPL-CCNC: 48 U/L (ref 0–35)
ATYPICAL LYMPHOCYTE RELATIVE PERCENT: 1 %
BASE EXCESS ARTERIAL: 1 (ref -3–3)
BASOPHILS ABSOLUTE: 0 K/UL (ref 0–0.2)
BASOPHILS RELATIVE PERCENT: 0.1 %
BILIRUB SERPL-MCNC: <0.2 MG/DL (ref 0.2–0.7)
BUN BLDV-MCNC: 13 MG/DL (ref 6–20)
CALCIUM IONIZED: 1.31 MMOL/L (ref 1.12–1.32)
CALCIUM SERPL-MCNC: 9.1 MG/DL (ref 8.5–9.9)
CHLORIDE BLD-SCNC: 106 MEQ/L (ref 95–107)
CO2: 26 MEQ/L (ref 20–31)
CREAT SERPL-MCNC: 0.67 MG/DL (ref 0.5–0.9)
EOSINOPHILS ABSOLUTE: 0 K/UL (ref 0–0.7)
EOSINOPHILS RELATIVE PERCENT: 0 %
GFR AFRICAN AMERICAN: >60
GFR AFRICAN AMERICAN: >60
GFR NON-AFRICAN AMERICAN: >60
GFR NON-AFRICAN AMERICAN: >60
GLOBULIN: 3.4 G/DL (ref 2.3–3.5)
GLUCOSE BLD-MCNC: 165 MG/DL (ref 70–99)
GLUCOSE BLD-MCNC: 179 MG/DL (ref 60–115)
HCO3 ARTERIAL: 24.9 MMOL/L (ref 21–29)
HCT VFR BLD CALC: 33.7 % (ref 37–47)
HEMOGLOBIN: 11 G/DL (ref 12–16)
HEMOGLOBIN: 11.5 GM/DL (ref 12–16)
LACTATE: 1.53 MMOL/L (ref 0.4–2)
LYMPHOCYTES ABSOLUTE: 1 K/UL (ref 1–4.8)
LYMPHOCYTES RELATIVE PERCENT: 3 %
MAGNESIUM: 2.3 MG/DL (ref 1.7–2.4)
MCH RBC QN AUTO: 29.5 PG (ref 27–31.3)
MCHC RBC AUTO-ENTMCNC: 32.6 % (ref 33–37)
MCV RBC AUTO: 90.7 FL (ref 82–100)
MONOCYTES ABSOLUTE: 0.2 K/UL (ref 0.2–0.8)
MONOCYTES RELATIVE PERCENT: 1 %
NEUTROPHILS ABSOLUTE: 22.6 K/UL (ref 1.4–6.5)
NEUTROPHILS RELATIVE PERCENT: 95 %
O2 SAT, ARTERIAL: 95 % (ref 93–100)
PCO2 ARTERIAL: 37 MM HG (ref 35–45)
PDW BLD-RTO: 14.6 % (ref 11.5–14.5)
PERFORMED ON: ABNORMAL
PH ARTERIAL: 7.43 (ref 7.35–7.45)
PLATELET # BLD: 518 K/UL (ref 130–400)
PLATELET SLIDE REVIEW: ABNORMAL
PO2 ARTERIAL: 71 MM HG (ref 75–108)
POC CHLORIDE: 111 MEQ/L (ref 99–110)
POC CREATININE: 0.7 MG/DL (ref 0.6–1.1)
POC FIO2: 4
POC HEMATOCRIT: 34 % (ref 36–48)
POC POTASSIUM: 4.3 MEQ/L (ref 3.5–5.1)
POC SAMPLE TYPE: ABNORMAL
POC SODIUM: 143 MEQ/L (ref 136–145)
POIKILOCYTES: ABNORMAL
POTASSIUM SERPL-SCNC: 4.5 MEQ/L (ref 3.4–4.9)
RBC # BLD: 3.71 M/UL (ref 4.2–5.4)
SODIUM BLD-SCNC: 143 MEQ/L (ref 135–144)
TCO2 ARTERIAL: 26 (ref 22–29)
TOTAL PROTEIN: 6 G/DL (ref 6.3–8)
VANCOMYCIN TROUGH: 12.1 UG/ML (ref 10–20)
WBC # BLD: 23.8 K/UL (ref 4.8–10.8)

## 2020-08-02 PROCEDURE — 83605 ASSAY OF LACTIC ACID: CPT

## 2020-08-02 PROCEDURE — 6360000002 HC RX W HCPCS: Performed by: INTERNAL MEDICINE

## 2020-08-02 PROCEDURE — 2580000003 HC RX 258: Performed by: INTERNAL MEDICINE

## 2020-08-02 PROCEDURE — 80053 COMPREHEN METABOLIC PANEL: CPT

## 2020-08-02 PROCEDURE — 83735 ASSAY OF MAGNESIUM: CPT

## 2020-08-02 PROCEDURE — 85014 HEMATOCRIT: CPT

## 2020-08-02 PROCEDURE — 85025 COMPLETE CBC W/AUTO DIFF WBC: CPT

## 2020-08-02 PROCEDURE — 82330 ASSAY OF CALCIUM: CPT

## 2020-08-02 PROCEDURE — 2700000000 HC OXYGEN THERAPY PER DAY

## 2020-08-02 PROCEDURE — 80202 ASSAY OF VANCOMYCIN: CPT

## 2020-08-02 PROCEDURE — 84295 ASSAY OF SERUM SODIUM: CPT

## 2020-08-02 PROCEDURE — 84132 ASSAY OF SERUM POTASSIUM: CPT

## 2020-08-02 PROCEDURE — 36600 WITHDRAWAL OF ARTERIAL BLOOD: CPT

## 2020-08-02 PROCEDURE — 1210000000 HC MED SURG R&B

## 2020-08-02 PROCEDURE — 94664 DEMO&/EVAL PT USE INHALER: CPT

## 2020-08-02 PROCEDURE — 82435 ASSAY OF BLOOD CHLORIDE: CPT

## 2020-08-02 PROCEDURE — 82803 BLOOD GASES ANY COMBINATION: CPT

## 2020-08-02 PROCEDURE — 71045 X-RAY EXAM CHEST 1 VIEW: CPT

## 2020-08-02 PROCEDURE — 36415 COLL VENOUS BLD VENIPUNCTURE: CPT

## 2020-08-02 PROCEDURE — 99233 SBSQ HOSP IP/OBS HIGH 50: CPT | Performed by: INTERNAL MEDICINE

## 2020-08-02 PROCEDURE — 82565 ASSAY OF CREATININE: CPT

## 2020-08-02 RX ORDER — METHYLPREDNISOLONE SODIUM SUCCINATE 125 MG/2ML
125 INJECTION, POWDER, LYOPHILIZED, FOR SOLUTION INTRAMUSCULAR; INTRAVENOUS EVERY 6 HOURS
Status: DISCONTINUED | OUTPATIENT
Start: 2020-08-02 | End: 2020-08-03

## 2020-08-02 RX ADMIN — ENOXAPARIN SODIUM 40 MG: 40 INJECTION SUBCUTANEOUS at 09:35

## 2020-08-02 RX ADMIN — VANCOMYCIN HYDROCHLORIDE 1000 MG: 1 INJECTION, POWDER, LYOPHILIZED, FOR SOLUTION INTRAVENOUS at 00:12

## 2020-08-02 RX ADMIN — METHYLPREDNISOLONE SODIUM SUCCINATE 125 MG: 125 INJECTION, POWDER, FOR SOLUTION INTRAMUSCULAR; INTRAVENOUS at 12:00

## 2020-08-02 RX ADMIN — PIPERACILLIN AND TAZOBACTAM 3.38 G: 3; .375 INJECTION, POWDER, LYOPHILIZED, FOR SOLUTION INTRAVENOUS at 20:07

## 2020-08-02 RX ADMIN — PIPERACILLIN AND TAZOBACTAM 3.38 G: 3; .375 INJECTION, POWDER, LYOPHILIZED, FOR SOLUTION INTRAVENOUS at 01:48

## 2020-08-02 RX ADMIN — PIPERACILLIN AND TAZOBACTAM 3.38 G: 3; .375 INJECTION, POWDER, LYOPHILIZED, FOR SOLUTION INTRAVENOUS at 12:00

## 2020-08-02 RX ADMIN — SODIUM CHLORIDE: 9 INJECTION, SOLUTION INTRAVENOUS at 12:00

## 2020-08-02 RX ADMIN — Medication 10 ML: at 00:13

## 2020-08-02 RX ADMIN — VANCOMYCIN HYDROCHLORIDE 1250 MG: 5 INJECTION, POWDER, LYOPHILIZED, FOR SOLUTION INTRAVENOUS at 17:14

## 2020-08-02 RX ADMIN — SODIUM CHLORIDE: 9 INJECTION, SOLUTION INTRAVENOUS at 01:49

## 2020-08-02 RX ADMIN — Medication 10 ML: at 20:07

## 2020-08-02 RX ADMIN — Medication 10 ML: at 09:36

## 2020-08-02 RX ADMIN — SODIUM CHLORIDE: 9 INJECTION, SOLUTION INTRAVENOUS at 23:05

## 2020-08-02 RX ADMIN — VANCOMYCIN HYDROCHLORIDE 1250 MG: 5 INJECTION, POWDER, LYOPHILIZED, FOR SOLUTION INTRAVENOUS at 09:34

## 2020-08-02 RX ADMIN — METHYLPREDNISOLONE SODIUM SUCCINATE 40 MG: 40 INJECTION, POWDER, FOR SOLUTION INTRAMUSCULAR; INTRAVENOUS at 00:12

## 2020-08-02 RX ADMIN — METHYLPREDNISOLONE SODIUM SUCCINATE 125 MG: 125 INJECTION, POWDER, FOR SOLUTION INTRAMUSCULAR; INTRAVENOUS at 17:14

## 2020-08-02 RX ADMIN — METHYLPREDNISOLONE SODIUM SUCCINATE 125 MG: 125 INJECTION, POWDER, FOR SOLUTION INTRAMUSCULAR; INTRAVENOUS at 23:01

## 2020-08-02 ASSESSMENT — PAIN SCALES - GENERAL
PAINLEVEL_OUTOF10: 0

## 2020-08-02 NOTE — PROGRESS NOTES
[facility] Respiratory Therapy Evaluation   Current Order:  Q4PRN DUONEB      Home Regimen: NONE      Ordering Physician: Randi Potts  Re-evaluation Date:       Diagnosis: HYPOXIA      Patient Status: Stable / Unstable + Physician notified    The following MDI Criteria must be met in order to convert aerosol to MDI with spacer. If unable to meet, MDI will be converted to aerosol:  []  Patient able to demonstrate the ability to use MDI effectively  []  Patient alert and cooperative  []  Patient able to take deep breath with 5-10 second hold  []  Medication(s) available in this delivery method   []  Peak flow greater than or equal to 200 ml/min            Current Order Substituted To  (same drug, same frequency)   Aerosol to MDI [] Albuterol Sulfate 0.083% unit dose by aerosol Albuterol Sulfate MDI 2 puffs by inhalation with spacer    [] Levalbuterol 1.25 mg unit dose by aerosol Levalbuterol MDI 2 puffs by inhalation with spacer    [] Levalbuterol 0.63 mg unit dose by aerosol Levalbuterol MDI 2 puffs by inhalation with spacer    [] Ipratropium Bromide 0.02% unit dose by aerosol Ipratropium Bromide MDI 2 puffs by inhalation with spacer    [] Duoneb (Ipratropium + Albuterol) unit dose by aerosol Ipratropium MDI + Albuterol MDI 2 puffs by inhalation w/spacer   MDI to Aerosol [] Albuterol Sulfate MDI Albuterol Sulfate 0.083% unit dose by aerosol    [] Levalbuterol MDI 2 puffs by inhalation Levalbuterol 1.25 mg unit dose by aerosol    [] Ipratropium Bromide MDI by inhalation Ipratropium Bromide 0.02% unit dose by aerosol    [] Combivent (Ipratropium + Albuterol) MDI by inhalation Duoneb (Ipratropium + Albuterol) unit dose by aerosol   Treatment Assessment [Frequency/Schedule]:  Change frequency to: _______________NO CHANGES  ___________________________________per Protocol, P&T, MEC      Points 0 1 2 3 4   Pulmonary Status  Non-Smoker  [x]   Smoking history   < 20 pack years  []   Smoking history  ?  20 pack years  [] Pulmonary Disorder  (acute or chronic)  []   Severe or Chronic w/ Exacerbation  []     Surgical Status No [x]   Surgeries     General []   Surgery Lower []   Abdominal Thoracic or []   Upper Abdominal Thoracic with  PulmonaryDisorder  []     Chest X-ray Clear/Not  Ordered     []  Chronic Changes  Results Pending  []  Infiltrates, atelectasis, pleural effusion, or edema  [x]  Infiltrates in more than one lobe []  Infiltrate + Atelectasis, &/or pleural effusion  []    Respiratory Pattern Regular,  RR = 12-20 []  Increased,  RR = 21-25 [x]  POPE, irregular,  or RR = 26-30 []  Decreased FEV1  or RR = 31-35 []  Severe SOB, use  of accessory muscles, or RR ? 35  []    Mental Status Alert, oriented,  Cooperative [x]  Confused but Follows commands []  Lethargic or unable to follow commands []  Obtunded  []  Comatose  []    Breath Sounds Clear to  auscultation  []  Decreased unilaterally or  in bases only [x]  Decreased  bilaterally  []  Crackles or intermittent wheezes []  Wheezes []    Cough Strong, Spontan., & nonproductive [x]  Strong,  spontaneous, &  productive []  Weak,  Nonproductive []  Weak, productive or  with wheezes []  No spontaneous  cough or may require suctioning []    Level of Activity Ambulatory [x]  Ambulatory w/ Assist  []  Non-ambulatory []  Paraplegic []  Quadriplegic []    Total    Score:______4_     Triage Score:______5__      Tri       Triage:     1. (>20) Freq: Q3    2. (16-20) Freq: Q4   3. (11-15) Freq: QID & Albuterol Q2 PRN    4. (6-10) Freq: TID & Albuterol Q2 PRN    5. (0-5) Freq Q4prn

## 2020-08-02 NOTE — PLAN OF CARE
Problem: Falls - Risk of:  Goal: Will remain free from falls  Outcome: Ongoing  Goal: Absence of physical injury  Outcome: Ongoing     Problem:  Activity:  Goal: Fatigue will decrease  Outcome: Ongoing     Problem: Cardiac:  Goal: Hemodynamic stability will improve  Outcome: Ongoing     Problem: Coping:  Goal: Level of anxiety will decrease  Outcome: Ongoing  Goal: Ability to cope will improve  Outcome: Ongoing  Goal: Ability to establish a method of communication will improve  Outcome: Ongoing     Problem: Nutritional:  Goal: Consumption of the prescribed amount of daily calories will improve  Outcome: Ongoing     Problem: Respiratory:  Goal: Ability to maintain a clear airway will improve  Outcome: Ongoing  Goal: Ability to maintain adequate ventilation will improve  Outcome: Ongoing  Goal: Complications related to the disease process, condition or treatment will be avoided or minimized  Outcome: Ongoing     Problem: Skin Integrity:  Goal: Risk for impaired skin integrity will decrease  Outcome: Ongoing     Problem: Discharge Planning:  Goal: Participates in care planning  Outcome: Ongoing  Goal: Discharged to appropriate level of care  Outcome: Ongoing     Problem: Discharge Planning:  Goal: Participates in care planning  Outcome: Ongoing  Goal: Discharged to appropriate level of care  Outcome: Ongoing

## 2020-08-02 NOTE — PROGRESS NOTES
Department of Internal Medicine  Progress Note      SUBJECTIVE: cURRENTLY ON 4 L nc. Afebrile. Blood pressure is stable  No acute events overnight.        ROS:  All 12 systems reviewed and negative except mentioned in HPI and Assessment and plan    MEDICATIONS:  Current Facility-Administered Medications   Medication Dose Route Frequency Provider Last Rate Last Dose    vancomycin (VANCOCIN) 1,250 mg in dextrose 5 % 250 mL IVPB  1,250 mg Intravenous Yumiko Cruz MD   Stopped at 08/02/20 1159    methylPREDNISolone sodium (SOLU-MEDROL) injection 125 mg  125 mg Intravenous Q6H Mariya Mallory MD   125 mg at 08/02/20 1200    hydrALAZINE (APRESOLINE) injection 10 mg  10 mg Intravenous Q4H PRN Rustam Toledo MD        vancomycin Miner Carlin) intermittent dosing (placeholder)   Other RX Placeholder Rustam Toledo MD        sodium chloride flush 0.9 % injection 10 mL  10 mL Intravenous 2 times per day Rustam Toledo MD   10 mL at 08/02/20 0936    sodium chloride flush 0.9 % injection 10 mL  10 mL Intravenous PRN Rustam Toledo MD        acetaminophen (TYLENOL) tablet 650 mg  650 mg Oral Q6H PRN Rustam Toledo MD        Or   Floydene Ada acetaminophen (TYLENOL) suppository 650 mg  650 mg Rectal Q6H PRN Rustam Toledo MD        polyethylene glycol (GLYCOLAX) packet 17 g  17 g Oral Daily PRN Rustam Toledo MD        promethazine (PHENERGAN) tablet 12.5 mg  12.5 mg Oral Q6H PRN Rustam Toledo MD        Or    ondansetron [facility]) injection 4 mg  4 mg Intravenous Q6H PRN Rustam Toledo MD        enoxaparin (LOVENOX) injection 40 mg  40 mg Subcutaneous Daily Rustam Toledo MD   40 mg at 08/02/20 0935    0.9 % sodium chloride infusion   Intravenous Continuous Rustam Toledo  mL/hr at 08/02/20 1200      piperacillin-tazobactam (ZOSYN) 3.375 g in dextrose 5 % 50 mL IVPB extended infusion (mini-bag)  3.375 g Intravenous Q8H Rustam Toledo MD 12.5 mL/hr at 08/02/20 1200 3.375 g at 08/02/20 1200    ipratropium-albuterol (Mendes Aubrie) nebulizer solution 1 ampule  1 ampule Inhalation Q4H PRN Jeanine Linn MD             OBJECTIVE:  Vital Signs:  Vitals:    08/02/20 1200   BP:    Pulse: 91   Resp: 24   Temp: 98.9 °F (37.2 °C)   SpO2: 91%         General Exam (except as mentioned above):  CONSTITUTIONAL: Awake, alert, no apparent distress  EYES:  PERRL, conjunctiva normal  ENT:  Normocephalic, atraumatic  NECK:  Supple  BACK:  Symmetric  LUNGS:  CTAB except bilateral basilar crackles. CARDIOVASCULAR:  S1S2 present  ABDOMEN:  soft, non-distended, non-tender  MUSCULOSKELETAL:  There is no redness, warmth, or swelling of the joints. NEUROLOGIC:  Alert, awake, oriented x 3. No FND  EXTREMITIES: Warm and well perfused. LABS  Recent Labs     07/31/20 1955 08/01/20 0516 08/02/20 0523 08/02/20 0607   WBC 20.9*  --  20.3* 23.8*  --    RBC 4.30  --  4.13* 3.71*  --    HGB 12.9   < > 12.1 11.0* 11.5*   HCT 38.2  --  36.7* 33.7*  --    MCV 88.7  --  88.8 90.7  --    MCH 30.1  --  29.3 29.5  --    MCHC 33.9  --  33.0 32.6*  --    RDW 14.0  --  14.4 14.6*  --    *  --  499* 518*  --     < > = values in this interval not displayed. Recent Labs     07/31/20 1955 08/01/20 0517 08/02/20 0523 08/02/20  0607     --  139 143  --    K 3.9  --  4.4 4.5  --    CL 99  --  102 106  --    CO2 24  --  21 26  --    BUN 15  --  9 13  --    CREATININE 0.61   < > 0.56 0.67 0.7   GLUCOSE 123*  --  156* 165*  --    CALCIUM 9.3  --  9.0 9.1  --     < > = values in this interval not displayed.        Recent Labs     08/01/20 0517 08/02/20 0523   MG 2.1 2.3           ASSESSMENT AND PLAN    Active Hospital Problems    Diagnosis Date Noted    Acute respiratory failure with hypoxia ([facility] [address] [J96.01] 07/31/2020     - Acute respiratory failure with hypoxia  - Atypical Pneumonia: On vancomycin and Zosyn for now until final cultures and antigen testing  - legionella, strep antigen, Histoplasma titres pending  - On soluemdrol every 6hrs  - Pulmonary to follow up while on medsurg  - ?  Bronch      DVT prophylaxis: Lovenox    35 minutes total care time, >1/2 in unit/floor time and care coordination   Mary Kate Blanco MD  Pager : [phone]

## 2020-08-02 NOTE — FLOWSHEET NOTE
7a-2p   Pt alert and oriented times 4 waiting on second COVID test. Pt tolerating 4 liters nasal cannula.  Pt transferred to [place] [id number]

## 2020-08-02 NOTE — FLOWSHEET NOTE
1542: pt arrived from ICU via wheelchair. Pt is A&Ox4. PERRLA. Pulses palpable. S1, S2 noted. Bowel sounds are active X4. Skin intact. Lungs are diminished bilaterally. Pt is on 4L NC. NO c/o SOB, dyspnea, or CP currently. Pt states that she does have a cough from time to time but overall feels much better.      1851: perfect serve sent to Dr. Jaden Lindsay to request something for acid reflux

## 2020-08-02 NOTE — PROGRESS NOTES
Physician Progress Note      PATIENTDoharpreet JORGE #:                  [id number]  :                       1970  ADMIT DATE:       2020 11:34 PM  [address] [place] DATE:  RESPONDING  PROVIDER #:        Carlos Garibay MD          QUERY TEXT:    Pt admitted withacute respiratory failure due to pneumonia. Pt noted to meet   sepsis criteria. If possible, please document in the progress notes and   discharge summary if you are evaluating and /or treating any of the following: The medical record reflects the following:  Risk Factors: ADHD, factor 5 lyden, acute respiratory failure, pneumonia  Clinical Indicators: WBC 20.9/20.3, HR , RR 20-37xray pending, CT chest   pending, covid test pending  Treatment: pulmonology consult, NS 100ml/hr, solumedrol, zosyn, vancomycin    Nicol STERN, RN, CDS  [phone]  Options provided:  -- Sepsis, present on admission  -- Sepsis, now resolved  -- Sepsis, not present on admission  -- Other - I will add my own diagnosis  -- Disagree - Clinically unable to determine / Unknown  -- Refer to Clinical Documentation Reviewer    PROVIDER RESPONSE TEXT:    This patient has sepsis which was present on admission.     Query created by: Bolivar Nielsen on 2020 9:20 AM      Electronically signed by:  Carlos Garibay MD 2020 12:38 PM

## 2020-08-02 NOTE — PROGRESS NOTES
Critical Care Medicine  Progress note      Chief complaint: Dry cough and dyspnea    HISTORY OF PRESENT ILLNESS:    Patient reports having increased coughing fits today still nonproductive but feels that she is \"loosening up\". Feels about the same in terms of dyspnea but has been in bed since arrival here, cannot  her exertional dyspnea. Her oxygenation is stabilized and possibly improved since yesterday as she is on 4 L now after being on 50% by Ventimask yesterday. Chest x-ray showed continued diffuse reticular alveolar infiltrates mainly in the upper lung zones. ABGs showed adequate oxygenation on 4 L with a PO2 of 71. Continues to show leukocytosis but she is on high-dose systemic steroids, thrombocytosis also slowly worsening. Inflammatory indices are markedly elevated with sed rate of 92 and CRP over 300. Rheumatoid factor was increased but that is somewhat nonspecific this presentation clinically. She has been afebrile since arrival but again has been on systemic steroids        Past Medical History:        Diagnosis Date    ADHD     Factor 5 Leiden mutation, heterozygous ([facility] [address]     MTHFR deficiency complicating pregnancy ([facility] [address]        Past Surgical History:    No past surgical history on file. Social History:     reports that she has never smoked. She has never used smokeless tobacco. She reports previous alcohol use. She reports that she does not use drugs. Family History:   No family history on file. Allergies:  Patient has no known allergies.         MEDICATIONS during current hospitalization:    Continuous Infusions:   sodium chloride 100 mL/hr at 08/02/20 1200       Scheduled Meds:   vancomycin  1,250 mg Intravenous Q8H    methylPREDNISolone  125 mg Intravenous Q6H    vancomycin (VANCOCIN) intermittent dosing (placeholder)   Other RX Placeholder    sodium chloride flush  10 mL Intravenous 2 times per day    enoxaparin  40 mg Subcutaneous Daily    piperacillin-tazobactam 3.375 g Intravenous Q8H       PRN Meds:hydrALAZINE, sodium chloride flush, acetaminophen **OR** acetaminophen, polyethylene glycol, promethazine **OR** ondansetron, ipratropium-albuterol        REVIEW OF SYSTEMS:  As in history of present illness  Other 14 point review of system is negative. PHYSICAL EXAM:    Vitals:  BP (!) 117/46   Pulse 52   Temp 97.9 °F (36.6 °C) (Oral)   Resp 25   Ht 5' 6\" (1.676 m)   Wt 175 lb 11.3 oz (79.7 kg)   SpO2 (!) 89%   BMI 28.36 kg/m²   General: Patient is currently alert, awake . comfortable in bed, No distress. Head: Atraumatic , Normocephalic   Eyes: PERRL. No icteric sclera. No conjunctival injection. No discharge   ENT: No nasal  discharge. Pharynx clear. Neck:  Trachea midline. No thyromegaly, no JVD, No cervical adenopathy. Chest : Increased spontaneous breathing effort, symmetric bilateral excursions  Lung : Diminished breath sounds bilaterally, few scattered end inspiratory coarse crackles in the upper lung fields bilaterally  Heart[de-identified] Regular rhythm and rate. No mumur ,  Rub or gallop  ABD: Benign. Non-tender. Non-distended. No masses or organmegaly. Normal bowel sounds. EXT: No significant pitting edema both lower extremities , No Cyanosis No clubbing  Neuro: no focal weakness  Skin: Warm and dry. No erythema or rash on exposed extremities. .      Data Review  Recent Labs     07/31/20 1955 08/01/20 0516 08/02/20 0523 08/02/20  0607   WBC 20.9*  --  20.3* 23.8*  --    HGB 12.9   < > 12.1 11.0* 11.5*   HCT 38.2  --  36.7* 33.7*  --    *  --  499* 518*  --     < > = values in this interval not displayed. Recent Labs     07/31/20 1955 08/01/20 0517 08/02/20 0523 08/02/20  0607     --  139 143  --    K 3.9  --  4.4 4.5  --    CL 99  --  102 106  --    CO2 24  --  21 26  --    BUN 15  --  9 13  --    CREATININE 0.61   < > 0.56 0.67 0.7   GLUCOSE 123*  --  156* 165*  --     < > = values in this interval not displayed.        MV Settings: ABGs:   Recent Labs     07/31/20 2022 08/02/20  0607   PHART 7.471* 7.434   OMA1ALZ 31* 37   PO2ART 72* 71*   JDP6MPP 22.8 24.9   BEART -1 1   [id number] 96* 95*   GGX4ECO 24 26     O2 Device: Other (Comment)(venti 50%)  Lab Results   Component Value Date    LACTA 1.2 07/22/2020       Radiology  Cta Chest W Wo Contrast    Result Date: 7/24/2020  EXAM: CTA CHEST W WO CONTRAST History: Shortness breath and cough Technique: Multiple contiguous axial images of the chest were obtained from the thoracic inlet through the upper abdomen with contrast. Multiplanar reformats including maximum intensity projection images were obtained. Comparison: None available Findings: Visualized portion of the thyroid gland is within normal limits. No axillary, mediastinal, or hilar lymphadenopathy. No thoracic aortic aneurysm or dissection. No filling defect within the pulmonary arteries. Heart size is at the upper limits of normal. No significant pericardial effusion. Esophagus is within normal limits. No pulmonary nodule or mass. Bilateral lower lobe consolidations. No pneumothorax. No pleural effusion. Degenerative changes of the spine. Visualized upper abdomen demonstrates no acute abnormality. Bilateral lower lobe pneumonia in the appropriate clinical setting. No pulmonary embolism. All CT scans at this facility use dose modulation, iterative reconstruction, and/or weight based dosing when appropriate to reduce radiation dose to as low as reasonably achievable. Xr Chest Portable    Result Date: 7/22/2020  EXAMINATION: XR CHEST PORTABLE DATE AND TIME:7/22/2020 1:30 PM CLINICAL HISTORY: Shortness of breath   fever  cough sob  COMPARISONS: None  FINDINGS: Fine pattern of interstitial coarsening throughout the lungs. This could represent a chronic pattern but there are no prior studies for comparison. Differential includes nonspecific pneumonitis. If symptoms persist consider CT imaging. ABNORMAL CHEST.  POSSIBLE DIFFUSE PNEUMONITIS. CLOSE FOLLOW-UP RECOMMENDED. Assessment, plan:   1. Acute hypoxic respiratory failure due to severe atypical pneumonia, stable if not showing some improvement in terms of oxygen requirement  2. Atypical pneumonitis of unclear etiology with migratory infiltrates initially presenting with dense consolidations, segmental or lobar, and the lower lung zones to now diffuse interstitial/alveolar infiltrates in the upper lung zones more than lower lung zones. There is evidence of heavy inflammatory process but this could still be infectious, awaiting further testing including antigen testing for histoplasma, pneumococcus, Legionella, ANCA titers. For now I will increase systemic steroids 225 mg every 6 hours at least for 1 or 2 days while continuing to assess her status daily. She may need bronchoscopy and BAL with transbronchial biopsies.   I had a long discussion with patient about differential diagnosis of this presentation including atypical infectious pneumonitis, fulminant histoplasma pneumonia, hypersensitivity pneumonia, vasculitic processes such as Wegener's, patient is clinically stable to transfer out of ICU but will continue close monitoring and follow-up        Electronically signed by Lyndsey Boyer MD, FCCP on 8/2/2020 at 12:29 PM

## 2020-08-02 NOTE — FLOWSHEET NOTE
Pt stated that she is feeling much better and that it is easier to breath. Removed venti mask pt o2 100% NC 4L. Pt independent. Rested through night.

## 2020-08-03 ENCOUNTER — APPOINTMENT (OUTPATIENT)
Dept: GENERAL RADIOLOGY | Age: 50
DRG: 871 | End: 2020-08-03
Attending: INTERNAL MEDICINE
Payer: COMMERCIAL

## 2020-08-03 VITALS
DIASTOLIC BLOOD PRESSURE: 67 MMHG | BODY MASS INDEX: 28.24 KG/M2 | HEART RATE: 60 BPM | WEIGHT: 175.71 LBS | SYSTOLIC BLOOD PRESSURE: 142 MMHG | TEMPERATURE: 98 F | OXYGEN SATURATION: 93 % | RESPIRATION RATE: 16 BRPM | HEIGHT: 66 IN

## 2020-08-03 LAB
ALBUMIN SERPL-MCNC: 2.8 G/DL (ref 3.5–4.6)
ALP BLD-CCNC: 80 U/L (ref 40–130)
ALT SERPL-CCNC: 121 U/L (ref 0–33)
ANION GAP SERPL CALCULATED.3IONS-SCNC: 12 MEQ/L (ref 9–15)
AST SERPL-CCNC: 64 U/L (ref 0–35)
BASE EXCESS ARTERIAL: 1 (ref -3–3)
BASOPHILS ABSOLUTE: 0 K/UL (ref 0–0.2)
BASOPHILS RELATIVE PERCENT: 0.1 %
BILIRUB SERPL-MCNC: <0.2 MG/DL (ref 0.2–0.7)
BUN BLDV-MCNC: 14 MG/DL (ref 6–20)
CALCIUM IONIZED: 1.28 MMOL/L (ref 1.12–1.32)
CALCIUM SERPL-MCNC: 9.3 MG/DL (ref 8.5–9.9)
CHLORIDE BLD-SCNC: 104 MEQ/L (ref 95–107)
CO2: 23 MEQ/L (ref 20–31)
CREAT SERPL-MCNC: 0.59 MG/DL (ref 0.5–0.9)
EOSINOPHILS ABSOLUTE: 0 K/UL (ref 0–0.7)
EOSINOPHILS RELATIVE PERCENT: 0 %
GFR AFRICAN AMERICAN: >60
GFR AFRICAN AMERICAN: >60
GFR NON-AFRICAN AMERICAN: 59
GFR NON-AFRICAN AMERICAN: >60
GLOBULIN: 3.6 G/DL (ref 2.3–3.5)
GLUCOSE BLD-MCNC: 137 MG/DL (ref 60–115)
GLUCOSE BLD-MCNC: 137 MG/DL (ref 70–99)
GLUCOSE BLD-MCNC: 153 MG/DL (ref 60–115)
HCO3 ARTERIAL: 24.9 MMOL/L (ref 21–29)
HCT VFR BLD CALC: 35.4 % (ref 37–47)
HEMOGLOBIN: 11 GM/DL (ref 12–16)
HEMOGLOBIN: 11.6 G/DL (ref 12–16)
LACTATE: 1.77 MMOL/L (ref 0.4–2)
LYMPHOCYTES ABSOLUTE: 0.7 K/UL (ref 1–4.8)
LYMPHOCYTES RELATIVE PERCENT: 3.1 %
MAGNESIUM: 2.1 MG/DL (ref 1.7–2.4)
MCH RBC QN AUTO: 29.6 PG (ref 27–31.3)
MCHC RBC AUTO-ENTMCNC: 32.9 % (ref 33–37)
MCV RBC AUTO: 90.1 FL (ref 82–100)
MONOCYTES ABSOLUTE: 0.3 K/UL (ref 0.2–0.8)
MONOCYTES RELATIVE PERCENT: 1.1 %
NEUTROPHILS ABSOLUTE: 22.6 K/UL (ref 1.4–6.5)
NEUTROPHILS RELATIVE PERCENT: 95.7 %
O2 SAT, ARTERIAL: 95 % (ref 93–100)
PCO2 ARTERIAL: 37 MM HG (ref 35–45)
PDW BLD-RTO: 14.4 % (ref 11.5–14.5)
PERFORMED ON: ABNORMAL
PERFORMED ON: ABNORMAL
PH ARTERIAL: 7.44 (ref 7.35–7.45)
PLATELET # BLD: 584 K/UL (ref 130–400)
PO2 ARTERIAL: 71 MM HG (ref 75–108)
POC CHLORIDE: 108 MEQ/L (ref 99–110)
POC CREATININE: 1 MG/DL (ref 0.6–1.1)
POC FIO2: 4
POC HEMATOCRIT: 32 % (ref 36–48)
POC POTASSIUM: 3.7 MEQ/L (ref 3.5–5.1)
POC SAMPLE TYPE: ABNORMAL
POC SODIUM: 141 MEQ/L (ref 136–145)
POTASSIUM SERPL-SCNC: 3.8 MEQ/L (ref 3.4–4.9)
PROCALCITONIN: 0.11 NG/ML (ref 0–0.15)
RBC # BLD: 3.93 M/UL (ref 4.2–5.4)
SODIUM BLD-SCNC: 139 MEQ/L (ref 135–144)
TCO2 ARTERIAL: 26 (ref 22–29)
TOTAL PROTEIN: 6.4 G/DL (ref 6.3–8)
VANCOMYCIN TROUGH: 13 UG/ML (ref 10–20)
WBC # BLD: 23.6 K/UL (ref 4.8–10.8)

## 2020-08-03 PROCEDURE — 2580000003 HC RX 258: Performed by: INTERNAL MEDICINE

## 2020-08-03 PROCEDURE — 84132 ASSAY OF SERUM POTASSIUM: CPT

## 2020-08-03 PROCEDURE — 84295 ASSAY OF SERUM SODIUM: CPT

## 2020-08-03 PROCEDURE — 83605 ASSAY OF LACTIC ACID: CPT

## 2020-08-03 PROCEDURE — 84145 PROCALCITONIN (PCT): CPT

## 2020-08-03 PROCEDURE — 1210000000 HC MED SURG R&B

## 2020-08-03 PROCEDURE — 36415 COLL VENOUS BLD VENIPUNCTURE: CPT

## 2020-08-03 PROCEDURE — 6360000002 HC RX W HCPCS: Performed by: INTERNAL MEDICINE

## 2020-08-03 PROCEDURE — 83735 ASSAY OF MAGNESIUM: CPT

## 2020-08-03 PROCEDURE — 86038 ANTINUCLEAR ANTIBODIES: CPT

## 2020-08-03 PROCEDURE — 80202 ASSAY OF VANCOMYCIN: CPT

## 2020-08-03 PROCEDURE — 82803 BLOOD GASES ANY COMBINATION: CPT

## 2020-08-03 PROCEDURE — 82435 ASSAY OF BLOOD CHLORIDE: CPT

## 2020-08-03 PROCEDURE — 80053 COMPREHEN METABOLIC PANEL: CPT

## 2020-08-03 PROCEDURE — 82565 ASSAY OF CREATININE: CPT

## 2020-08-03 PROCEDURE — 82330 ASSAY OF CALCIUM: CPT

## 2020-08-03 PROCEDURE — 2700000000 HC OXYGEN THERAPY PER DAY

## 2020-08-03 PROCEDURE — 99233 SBSQ HOSP IP/OBS HIGH 50: CPT | Performed by: INTERNAL MEDICINE

## 2020-08-03 PROCEDURE — 85014 HEMATOCRIT: CPT

## 2020-08-03 PROCEDURE — 85025 COMPLETE CBC W/AUTO DIFF WBC: CPT

## 2020-08-03 PROCEDURE — 71046 X-RAY EXAM CHEST 2 VIEWS: CPT

## 2020-08-03 PROCEDURE — 83516 IMMUNOASSAY NONANTIBODY: CPT

## 2020-08-03 RX ORDER — FUROSEMIDE 10 MG/ML
20 INJECTION INTRAMUSCULAR; INTRAVENOUS ONCE
Status: COMPLETED | OUTPATIENT
Start: 2020-08-03 | End: 2020-08-03

## 2020-08-03 RX ORDER — METHYLPREDNISOLONE SODIUM SUCCINATE 40 MG/ML
40 INJECTION, POWDER, LYOPHILIZED, FOR SOLUTION INTRAMUSCULAR; INTRAVENOUS DAILY
Status: DISCONTINUED | OUTPATIENT
Start: 2020-08-04 | End: 2020-08-04 | Stop reason: HOSPADM

## 2020-08-03 RX ADMIN — METHYLPREDNISOLONE SODIUM SUCCINATE 125 MG: 125 INJECTION, POWDER, FOR SOLUTION INTRAMUSCULAR; INTRAVENOUS at 06:30

## 2020-08-03 RX ADMIN — Medication 10 ML: at 10:23

## 2020-08-03 RX ADMIN — PIPERACILLIN AND TAZOBACTAM 3.38 G: 3; .375 INJECTION, POWDER, LYOPHILIZED, FOR SOLUTION INTRAVENOUS at 13:28

## 2020-08-03 RX ADMIN — PIPERACILLIN AND TAZOBACTAM 3.38 G: 3; .375 INJECTION, POWDER, LYOPHILIZED, FOR SOLUTION INTRAVENOUS at 03:56

## 2020-08-03 RX ADMIN — VANCOMYCIN HYDROCHLORIDE 1250 MG: 5 INJECTION, POWDER, LYOPHILIZED, FOR SOLUTION INTRAVENOUS at 00:55

## 2020-08-03 RX ADMIN — VANCOMYCIN HYDROCHLORIDE 1250 MG: 5 INJECTION, POWDER, LYOPHILIZED, FOR SOLUTION INTRAVENOUS at 10:22

## 2020-08-03 RX ADMIN — ENOXAPARIN SODIUM 40 MG: 40 INJECTION SUBCUTANEOUS at 10:23

## 2020-08-03 RX ADMIN — FUROSEMIDE 20 MG: 10 INJECTION, SOLUTION INTRAMUSCULAR; INTRAVENOUS at 13:28

## 2020-08-03 RX ADMIN — SODIUM CHLORIDE: 9 INJECTION, SOLUTION INTRAVENOUS at 10:22

## 2020-08-03 ASSESSMENT — PAIN DESCRIPTION - PROGRESSION: CLINICAL_PROGRESSION: GRADUALLY WORSENING

## 2020-08-03 ASSESSMENT — PAIN SCALES - GENERAL: PAINLEVEL_OUTOF10: 0

## 2020-08-03 NOTE — PROGRESS NOTES
Pharmacy Vancomycin Consult     Vancomycin Day: 4  Current Dosin mg IV q8h    Recent Labs     20  0523 20  0536   BUN 13 14       Recent Labs     20  0536 20  0809   CREATININE 0.59 1.0       Recent Labs     20  0523 20  0536   WBC 23.8* 23.6*       Ht Readings from Last 1 Encounters:   20 5' 6\" (1.676 m)        Wt Readings from Last 1 Encounters:   20 175 lb 11.3 oz (79.7 kg)         Body mass index is 28.36 kg/m². Estimated Creatinine Clearance: 72 mL/min (based on SCr of 1 mg/dL). Trough:   Recent Labs     20  0910   VANCOTROUGH 13.0       Assessment/Plan:  Vancomycin level is 13 today which is below target 15-20 for HAP, drawn appropriate for administration time. SCr has increased to 1.0, will continue monitor daily. Will keep vancomycin dose at 1250 mg IV q8h for now.

## 2020-08-03 NOTE — PROGRESS NOTES
INPATIENT PROGRESS NOTES    PATIENT NAME: Shaka Veras  MRN: [id number]  SERVICE DATE:  August 3, 2020   SERVICE TIME:  10:17 AM      PRIMARY SERVICE: Pulmonary Disease    CHIEF COMPLAINTS: Interstitial lung disease    INTERVAL HPI: Patient seen and examined at bedside, Interval Notes, orders reviewed. Nursing notes noted    Patient report shortness of breath, minimal cough, no chest pain, no fever, slept okay, no nausea no vomiting    Review of system:     GI Abdominal pain No  Skin Rash No    Social History     Tobacco Use    Smoking status: Never Smoker    Smokeless tobacco: Never Used   Substance Use Topics    Alcohol use: Not Currently     No family history on file. OBJECTIVE    Body mass index is 28.36 kg/m². PHYSICAL EXAM:  Vitals:  BP (!) 142/67   Pulse 60   Temp 98 °F (36.7 °C) (Oral)   Resp 16   Ht 5' 6\" (1.676 m)   Wt 175 lb 11.3 oz (79.7 kg)   SpO2 93%   BMI 28.36 kg/m²     General: alert, cooperative, no distress  Head: normocephalic, atraumatic  Eyes:No gross abnormalities. ENT:  MMM no lesions  Neck:  supple and no masses  Chest : Diffuse rales, no wheezing, nontender, tympanic  Heart[de-identified] Heart sounds are normal.  Regular rate and rhythm without murmur, gallop or rub. ABD:  symmetric, soft, non-tender  Musculoskeletal : no cyanosis, no clubbing and no edema  Neuro:  Grossly normal  Skin: No rashes or nodules noted. Lymph node:  no cervical nodes  Urology: No [place]   Psychiatric: appropriate    DATA:   Recent Labs     08/02/20 0523 08/03/20  0536 08/03/20  0809   WBC 23.8*  --  23.6*  --    HGB 11.0*   < > 11.6* 11.0*   HCT 33.7*  --  35.4*  --    MCV 90.7  --  90.1  --    *  --  584*  --     < > = values in this interval not displayed.      Recent Labs     08/02/20 0523 08/03/20  0536 08/03/20  0809     --  139  --    K 4.5  --  3.8  --      --  104  --    CO2 26  --  23  --    BUN 13  --  14  --    CREATININE 0.67   < > 0.59 1.0   GLUCOSE 165*  --  137*  -- CALCIUM 9.1  --  9.3  --    PROT 6.0*  --  6.4  --    LABALBU 2.6*  --  2.8*  --    BILITOT <0.2  --  <0.2  --    ALKPHOS 74  --  80  --    AST 48*  --  64*  --    ALT 57*  --  121*  --    LABGLOM >60.0   < > >60.0 59*   GFRAA >60.0   < > >60.0 >60   GLOB 3.4  --  3.6*  --     < > = values in this interval not displayed. MV Settings:          Recent Labs     08/03/20  0809   PHART 7.436   PWN5NPJ 37   PO2ART 71*   PCK9QVJ 24.9   BEART 1   C9PTXNGB 95*       O2 Device: Nasal cannula  O2 Flow Rate (L/min): 3 L/min    DIET GENERAL; Safety Tray; Safety Tray (Disposables)     MEDICATIONS during current hospitalization:    Continuous Infusions:   sodium chloride 75 mL/hr at 08/02/20 2305       Scheduled Meds:   vancomycin  1,250 mg Intravenous Q8H    methylPREDNISolone  125 mg Intravenous Q6H    vancomycin (VANCOCIN) intermittent dosing (placeholder)   Other RX Placeholder    sodium chloride flush  10 mL Intravenous 2 times per day    enoxaparin  40 mg Subcutaneous Daily    piperacillin-tazobactam  3.375 g Intravenous Q8H       PRN Meds:hydrALAZINE, sodium chloride flush, acetaminophen **OR** acetaminophen, polyethylene glycol, promethazine **OR** ondansetron, ipratropium-albuterol    Radiology  Xr Chest (2 Vw)    Result Date: 8/1/2020  EXAMINATION: XR CHEST (2 VW). DATE AND TIME:7/31/2020 7:53 PM CLINICAL HISTORY: Shortness of breath   SOB  COMPARISONS: July 22, 2020  FINDINGS: Diffuse patchy bilateral opacities with upper lobe predominance. Possibility of covid-19 pneumonia is raised, however the specificity of chest x-ray findings for COVID-19 pneumonia is not established. Increasing bilateral infiltrates EXAMINATION: XR CHEST (2 VW)  DATE AND TIME:7/31/2020 7:53 PM CLINICAL HISTORY: Acute chest pain. Shortness of breath  SOB  COMPARISON: None available.  TECHNIQUE: Helical axial CTA of the chest was performed following the intravenous administration of 100 mL Optiray 320 intravenous contrast.  2D images were reconstructed in the axial and coronal planes. 3-D MIP images were generated in the coronal plane. Images were reviewed on the PACS workstation. All images including the 3D MIPS were permanently archived. All CT scans at this facility use dose modulation, iterative reconstruction, and/or weight based dosing when appropriate to reduce radiation dose to as low as reasonably achievable. FINDINGS: Pulmonary arteries: There is good opacification of the main, lobar, segmental and proximal subsegmental pulmonary artery branches. No sign of pulmonary embolus in the central pulmonary arterial tree. No evidence of thoracic aortic aneurysm or dissection. Lungs: Significant progression of previous bilateral lower lobe infiltrates. There now are diffuse patchy consolidations with an upper lobe predominance and diffuse reticular nodular changes. Imaging features can be seen with(COVID-19) pneumonia, though they are nonspecific and can occur with a variety of infectious or noninfectious processes. Mediastinum: Small stable probable reactive bilateral hilar and prevascular lymph nodes. Trachea:Negative                            Vessels:Thoracic aorta is intact. Visualized abdomen: The visualized portions of the upper abdomen are unremarkable. Bones: No acute bone pathology IMPRESSION: NO EVIDENCE OF PULMONARY EMBOLISM. SIGNIFICANT PROGRESSION OF DIFFUSE BILATERAL INFILTRATES     Cta Chest W Wo Contrast    Result Date: 7/24/2020  EXAM: CTA CHEST W WO CONTRAST History: Shortness breath and cough Technique: Multiple contiguous axial images of the chest were obtained from the thoracic inlet through the upper abdomen with contrast. Multiplanar reformats including maximum intensity projection images were obtained.  Comparison: None available Findings: Visualized portion of the thyroid gland is within normal limits. No axillary, mediastinal, or hilar lymphadenopathy. No thoracic aortic aneurysm or dissection. No filling defect within the pulmonary arteries. Heart size is at the upper limits of normal. No significant pericardial effusion. Esophagus is within normal limits. No pulmonary nodule or mass. Bilateral lower lobe consolidations. No pneumothorax. No pleural effusion. Degenerative changes of the spine. Visualized upper abdomen demonstrates no acute abnormality. Bilateral lower lobe pneumonia in the appropriate clinical setting. No pulmonary embolism. All CT scans at this facility use dose modulation, iterative reconstruction, and/or weight based dosing when appropriate to reduce radiation dose to as low as reasonably achievable. Xr Chest Portable    Result Date: 8/2/2020  EXAMINATION: XR CHEST PORTABLE CLINICAL HISTORY: ATYPICAL PNEUMONIA COMPARISONS: CT CHEST, JULY 31, 2020, CHEST RADIOGRAPH JULY 31, 2020 FINDINGS: Low lung volumes. Osseous structures intact. Cardiopericardial silhouette upper limits of normal for technique. Diffuse patchy reticular opacities, bilateral lung zones. BILATERAL INTERSTITIAL AND ALVEOLAR PNEUMONIA. BORDERLINE CARDIOMEGALY. Xr Chest Portable    Result Date: 7/22/2020  EXAMINATION: XR CHEST PORTABLE DATE AND TIME:7/22/2020 1:30 PM CLINICAL HISTORY: Shortness of breath   fever  cough sob  COMPARISONS: None  FINDINGS: Fine pattern of interstitial coarsening throughout the lungs. This could represent a chronic pattern but there are no prior studies for comparison. Differential includes nonspecific pneumonitis. If symptoms persist consider CT imaging. ABNORMAL CHEST. POSSIBLE DIFFUSE PNEUMONITIS. CLOSE FOLLOW-UP RECOMMENDED.     CT chest reviewed with the patient, bilateral interstitial lung disease mainly in apical lung zones with consolidative changes in the apical zones and hazy groundglass changes throughout the base        IMPRESSION AND SUGGESTION:  Patient is at risk due to   · Interstitial lung disease, etiology is not clear per history patient had remodeling at her new house started in April she did get exposed to a lot of dust, chemicals, and bats and birds feces. · Possible differential diagnosis includes, inhalation injury, infection, hypersensitivity pneumonia, fungal infection, and less likely vasculitis  · COVID-19 is unlikely she was tested almost 5 times since the start of the symptoms and all came negative  · Acute hypoxic respiratory failure currently on 3 L oxygen secondary to above   · Leukocytosis likely steroid-induced    Recommendation  · Bronchoscopy with transbronchial biopsy, BAL and brushing. · Discussed with the patient need for above procedure, and if nondiagnostic then need to consider open lung biopsy  · Discussed with patient risks include but not limited to bleeding, infection, lung collapse , cardiac arrhythmia, need for other procedures or allergy to med, benefits and alternatives discussed with patient. · I reviewed CT imaging with her, and discussed all parts of procedures detailed  · At the end she prefer to be transferred to [facility] to have her work-up done there. · We will check ANTONETTE and ANCA  · Sputum Gram stain and culture  · Sputum fungal culture  · She is currently on broad-spectrum antibiotics  · She is on high-dose steroids received multiple courses since the symptoms started, due to my concern of fungal infection I will lower the steroid dose for now pending bronchoscopy    Transfer to [facility] clinic based on her request.     I spent 30 min with this patient, greater the 50% of this time was spent in counseling and/or coordinating of care.     Electronically signed by Tello Burgos MD,  FCCP   on 8/3/2020 at 10:17 AM

## 2020-08-03 NOTE — PLAN OF CARE
Problem: Falls - Risk of:  Goal: Will remain free from falls  Description: Will remain free from falls  Outcome: Ongoing  Goal: Absence of physical injury  Description: Absence of physical injury  Outcome: Ongoing     Problem:  Activity:  Goal: Fatigue will decrease  Description: Fatigue will decrease  Outcome: Ongoing     Problem: Cardiac:  Goal: Hemodynamic stability will improve  Description: Hemodynamic stability will improve  Outcome: Ongoing     Problem: Coping:  Goal: Level of anxiety will decrease  Description: Level of anxiety will decrease  Outcome: Ongoing  Goal: Ability to cope will improve  Description: Ability to cope will improve  Outcome: Ongoing  Goal: Ability to establish a method of communication will improve  Description: Ability to establish a method of communication will improve  Outcome: Ongoing     Problem: Nutritional:  Goal: Consumption of the prescribed amount of daily calories will improve  Description: Consumption of the prescribed amount of daily calories will improve  Outcome: Ongoing     Problem: Respiratory:  Goal: Ability to maintain a clear airway will improve  Description: Ability to maintain a clear airway will improve  Outcome: Ongoing  Goal: Ability to maintain adequate ventilation will improve  Description: Ability to maintain adequate ventilation will improve  Outcome: Ongoing  Goal: Complications related to the disease process, condition or treatment will be avoided or minimized  Description: Complications related to the disease process, condition or treatment will be avoided or minimized  Outcome: Ongoing     Problem: Skin Integrity:  Goal: Risk for impaired skin integrity will decrease  Description: Risk for impaired skin integrity will decrease  Outcome: Ongoing     Problem: Discharge Planning:  Goal: Participates in care planning  Description: Participates in care planning  Outcome: Ongoing  Goal: Discharged to appropriate level of care  Description: Discharged to appropriate level of care  Outcome: Ongoing     Problem: Airway Clearance - Ineffective:  Goal: Ability to maintain a clear airway will improve  Description: Ability to maintain a clear airway will improve  Outcome: Ongoing     Problem: Anxiety/Stress:  Goal: Level of anxiety will decrease  Description: Level of anxiety will decrease  Outcome: Ongoing     Problem: Aspiration:  Goal: Absence of aspiration  Description: Absence of aspiration  Outcome: Ongoing     Problem:  Bowel Function - Altered:  Goal: Bowel elimination is within specified parameters  Description: Bowel elimination is within specified parameters  Outcome: Ongoing     Problem: Cardiac Output - Decreased:  Goal: Hemodynamic stability will improve  Description: Hemodynamic stability will improve  Outcome: Ongoing     Problem: Fluid Volume - Imbalance:  Goal: Absence of imbalanced fluid volume signs and symptoms  Description: Absence of imbalanced fluid volume signs and symptoms  Outcome: Ongoing     Problem: Gas Exchange - Impaired:  Goal: Levels of oxygenation will improve  Description: Levels of oxygenation will improve  Outcome: Ongoing     Problem: Mental Status - Impaired:  Goal: Mental status will be restored to baseline  Description: Mental status will be restored to baseline  Outcome: Ongoing     Problem: Nutrition Deficit:  Goal: Ability to achieve adequate nutritional intake will improve  Description: Ability to achieve adequate nutritional intake will improve  Outcome: Ongoing     Problem: Pain:  Goal: Pain level will decrease  Description: Pain level will decrease  Outcome: Ongoing  Goal: Recognizes and communicates pain  Description: Recognizes and communicates pain  Outcome: Ongoing  Goal: Control of acute pain  Description: Control of acute pain  Outcome: Ongoing  Goal: Control of chronic pain  Description: Control of chronic pain  Outcome: Ongoing     Problem: Serum Glucose Level - Abnormal:  Goal: Ability to maintain appropriate glucose levels will improve to within specified parameters  Description: Ability to maintain appropriate glucose levels will improve to within specified parameters  Outcome: Ongoing     Problem: Skin Integrity - Impaired:  Goal: Will show no infection signs and symptoms  Description: Will show no infection signs and symptoms  Outcome: Ongoing  Goal: Absence of new skin breakdown  Description: Absence of new skin breakdown  Outcome: Ongoing     Problem: Sleep Pattern Disturbance:  Goal: Appears well-rested  Description: Appears well-rested  Outcome: Ongoing     Problem: Tissue Perfusion, Altered:  Goal: Circulatory function within specified parameters  Description: Circulatory function within specified parameters  Outcome: Ongoing     Problem: Tissue Perfusion - Cardiopulmonary, Altered:  Goal: Hemodynamic stability will improve  Description: Hemodynamic stability will improve  Outcome: Ongoing  Goal: Absence of angina  Description: Absence of angina  Outcome: Ongoing

## 2020-08-03 NOTE — PROGRESS NOTES
Conference call with [facility] to [facility]. Patient accepted to [address] [place] [address] for ongoing care.

## 2020-08-03 NOTE — DISCHARGE INSTR - COC
Continuity of Care Form    Patient Name: Bria Del Real   :  1970  MRN:  [id number]    Admit date:  2020  Discharge date:  8/3/2020    Code Status Order: Full Code   Advance Directives:   [facility] Documentation     Date/Time Healthcare Directive Type of Healthcare Directive Copy in [facility] [address] Agent's Name Healthcare Agent's Phone Number    [phone]  No, patient does not have an advance directive for healthcare treatment -- -- -- -- --          Admitting Physician: Demond Jeffries MD  PCP: Edwige Conteh MD    Discharging Nurse: [facility] Unit/Room#: U028/Y944-21  Discharging Unit Phone Number: ***    Emergency Contact:   Extended Emergency Contact Information  Primary Emergency Contact: Lizy Nazario  Home Phone: [phone]  Relation: Brother/Sister  Secondary Emergency Contact: Marleen Gr  Mobile Phone: [phone]  Relation: Domestic Partner    Past Surgical History:  No past surgical history on file. Immunization History: There is no immunization history on file for this patient.     Active Problems:  Patient Active Problem List   Diagnosis Code    COPD exacerbation ([facility] [address] J44.1    Acute respiratory failure with hypoxia ([facility]) J96.01       Isolation/Infection:   Isolation          Droplet Plus        Patient Infection Status     Infection Onset Added Last Indicated Last Indicated By Review Planned Expiration Resolved Resolved By    COVID-19 Rule Out 20 COVID-19 (Ordered)        Resolved    COVID-19 Rule Out 20 COVID-19 (Ordered)   20 Rule-Out Test Resulted    COVID-19 Rule Out 20 COVID-19 (Ordered)   20 Rule-Out Test Resulted          Nurse Assessment:  Last Vital Signs: BP (!) 142/67   Pulse 60   Temp 98 °F (36.7 °C) (Oral)   Resp 16   Ht 5' 6\" (1.676 m)   Wt 175 lb 11.3 oz (79.7 kg)   SpO2 93%   BMI 28.36 kg/m²     Last documented pain score (0-10 scale): Pain Level: 0  Last Weight:   Wt Readings from Last 1 Encounters:   08/01/20 175 lb 11.3 oz (79.7 kg)     Mental Status:  oriented    IV Access:  - Peripheral IV - site  R Upper Arm, insertion date: 7/31/2020    Nursing Mobility/ADLs:  Walking   Independent  Transfer  Independent  Bathing  Independent  Dressing  Independent  Toileting  Independent  Feeding  Independent  Med [address]  Independent  Med Delivery   none    Wound Care Documentation and Therapy:        Elimination:  Continence:   · Bowel: No  · Bladder: {YES / KO:76460}  Urinary Catheter: None   Colostomy/Ileostomy/Ileal Conduit: No       Date of Last BM: 8/3/2020    Intake/Output Summary (Last 24 hours) at 8/3/2020 1801  Last data filed at 8/3/2020 0809  Gross per 24 hour   Intake 1821.25 ml   Output --   Net 1821.25 ml     I/O last 3 completed shifts: In: 3214.6 [P.O.:355; I.V.:2259.6; IV Piggyback:600]  Out: -     Safety Concerns: At Risk for Falls    Impairments/Disabilities:      None    Nutrition Therapy:  Current Nutrition Therapy:   - Oral Diet:  General    Routes of Feeding: Oral  Liquids: Thin Liquids  Daily Fluid Restriction: no  Last Modified Barium Swallow with Video (Video Swallowing Test): not done    Treatments at the Time of Hospital Discharge:   Respiratory Treatments: ***  Oxygen Therapy:  is not on home oxygen therapy.   Ventilator:    - No ventilator support    Rehab Therapies: {THERAPEUTIC INTERVENTION:0390963316}  Weight Bearing Status/Restrictions: No weight bearing restirctions  Other Medical Equipment (for information only, NOT a DME order):  {EQUIPMENT:973090655}  Other Treatments: ***    Patient's personal belongings (please select all that are sent with patient):  Glasses    RN SIGNATURE:  Electronically signed by Connie Cabrales RN on 8/3/2020 at 6:06 PM      CASE MANAGEMENT/SOCIAL WORK SECTION    Inpatient Status Date: ***    Readmission Risk Assessment Score:  Readmission Risk Risk of Unplanned Readmission:        12           Discharging to Facility/ Agency   · Name:   · Address:  · Phone:  · Fax:    Dialysis Facility (if applicable)   · Name:  · Address:  · Dialysis Schedule:  · Phone:  · Fax:    / signature: {Esignature:[id number]}    PHYSICIAN SECTION    Prognosis: Good    Condition at Discharge: Stable    Rehab Potential (if transferring to Rehab): Good      Physician Certification: I certify the above information and transfer of Gabriela Moreno  is necessary for the continuing treatment of the diagnosis listed and that she requires {Admit to Appropriate Level of Care:01961} for {GREATER/LESS:769436342} 30 days.      Update Admission H&P: {CHP DME Changes in QZEAO:551682567}    PHYSICIAN SIGNATURE:  {Esignature:[id number]}

## 2020-08-03 NOTE — PROGRESS NOTES
Hospitalist Progress Note      Date of Admission: 7/31/2020  Chief Complaint:    No chief complaint on file. Subjective:  No new complaints. No nausea, vomiting, chest pain, or headache      Medications:    Infusion Medications    sodium chloride 75 mL/hr at 08/02/20 2305     Scheduled Medications    vancomycin  1,250 mg Intravenous Q8H    methylPREDNISolone  125 mg Intravenous Q6H    vancomycin (VANCOCIN) intermittent dosing (placeholder)   Other RX Placeholder    sodium chloride flush  10 mL Intravenous 2 times per day    enoxaparin  40 mg Subcutaneous Daily    piperacillin-tazobactam  3.375 g Intravenous Q8H     PRN Meds: hydrALAZINE, sodium chloride flush, acetaminophen **OR** acetaminophen, polyethylene glycol, promethazine **OR** ondansetron, ipratropium-albuterol    Intake/Output Summary (Last 24 hours) at 8/3/2020 0854  Last data filed at 8/2/2020 1857  Gross per 24 hour   Intake 2058. 33 ml   Output 450 ml   Net 1608.33 ml     Exam:  BP (!) 143/80   Pulse 76   Temp 97 °F (36.1 °C) (Oral)   Resp 18   Ht 5' 6\" (1.676 m)   Wt 175 lb 11.3 oz (79.7 kg)   SpO2 93%   BMI 28.36 kg/m²   Head: Normocephalic, atraumatic  Sclera clear  Neck supple, nontender  Lungs: Examination deferred to pulmonology to minimize contacts to nonessential personnel as recommended by CDC guidelines while treating/ruling out COVID.     Labs:   Recent Labs     08/01/20  0516 08/02/20  0523 08/02/20  0607 08/03/20  0536 08/03/20  0809   WBC 20.3* 23.8*  --  23.6*  --    HGB 12.1 11.0* 11.5* 11.6* 11.0*   HCT 36.7* 33.7*  --  35.4*  --    * 518*  --  584*  --      Recent Labs     08/01/20  0517 08/02/20  0523 08/02/20  0607 08/03/20  0536 08/03/20  0809    143  --  139  --    K 4.4 4.5  --  3.8  --     106  --  104  --    CO2 21 26  --  23  --    BUN 9 13  --  14  --    CREATININE 0.56 0.67 0.7 0.59 1.0   CALCIUM 9.0 9.1  --  9.3  --    AST 14 48*  --  64*  --    ALT 29 57*  --  121*  --    BILITOT 0.4 <0.2 --  <0.2  --    ALKPHOS 85 74  --  80  --      Recent Labs     07/31/20 1955   INR 1.1     Recent Labs     07/31/20 1955   TROPONINI <0.010     Radiology:  XR CHEST PORTABLE   Final Result   BILATERAL INTERSTITIAL AND ALVEOLAR PNEUMONIA. BORDERLINE CARDIOMEGALY. XR CHEST (2 VW)    (Results Pending)     Assessment/Plan:    Acute hypoxic respiratory failure: Following with pulmonology. Broad differential.    Diffuse infiltrates noted, following with pulmonology.     35 minutes total care time, >1/2 in unit/floor time and care coordination     Electronically signed by Kaye Chaudhry MD on 8/3/2020 at 8:54 AM

## 2020-08-03 NOTE — ED PROVIDER NOTES
per week: None     Minutes per session: None    Stress: None   Relationships    Social connections     Talks on phone: None     Gets together: None     Attends Faith service: None     Active member of club or organization: None     Attends meetings of clubs or organizations: None     Relationship status: None    Intimate partner violence     Fear of current or ex partner: None     Emotionally abused: None     Physically abused: None     Forced sexual activity: None   Other Topics Concern    None   Social History Narrative    None       REVIEW OF SYSTEMS    Constitutional: Complains of fever, chills, and weakness   Eyes:  Denies photophobia or discharge   HENT:  Denies sore throat or ear pain   Respiratory: Complains of cough and shortness of breath   Cardiovascular:  Denies chest pain, palpitations or swelling   GI:  Denies abdominal pain, nausea, vomiting, or diarrhea   Musculoskeletal:  Denies back pain   Skin:  Denies rash   Neurologic:  Denies headache, focal weakness or sensory changes   Endocrine:  Denies polyuria or polydypsia   Lymphatic:  Denies swollen glands   Psychiatric:  Denies depression, suicidal ideation or homicidal ideation   All systems negative except as marked. PHYSICAL EXAM    VITAL SIGNS: BP (!) 140/76   Pulse 101   Temp 98.9 °F (37.2 °C) (Oral)   Resp 30   Ht 5' 6\" (1.676 m)   Wt 172 lb (78 kg)   SpO2 92%   BMI 27.76 kg/m²    Constitutional: Moderate acute distress secondary to shortness of breath, Non-toxic appearance. HENT:  Normocephalic, Atraumatic, Bilateral external ears normal, Oropharynx moist, No oral exudates, Nose normal. Neck- Normal range of motion, No tenderness, Supple, No stridor. Eyes:  PERRL, EOMI, Conjunctiva normal, No discharge. Respiratory:  Normal breath sounds, moderate respiratory distress, diffuse expiratory wheezing, No chest tenderness. Cardiovascular: Tachycardic, Normal rhythm, No murmurs, No rubs, No gallops.    GI:  Bowel sounds normal, Soft, No tenderness, No masses, No pulsatile masses. : No CVA tenderness. Musculoskeletal:  Intact distal pulses, No edema, No tenderness, No cyanosis, No clubbing. Good range of motion in all major joints. No tenderness to palpation or major deformities noted. Back- No tenderness. Integument:  Warm, Dry, No erythema, No rash. Lymphatic:  No lymphadenopathy noted. Neurologic:  Alert & oriented x 3, Normal motor function, Normal sensory function, No focal deficits noted. Psychiatric:  Affect anxious, Judgment normal, Mood normal.     RADIOLOGY    XR CHEST (2 VW)   Final Result   Increasing bilateral infiltrates            EXAMINATION: XR CHEST (2 VW)        DATE AND TIME:7/31/2020 7:53 PM      CLINICAL HISTORY: Acute chest pain. Shortness of breath  SOB        COMPARISON: None available. TECHNIQUE: Helical axial CTA of the chest was performed following the intravenous administration of 100 mL Optiray 320 intravenous contrast.  2D images were reconstructed in the axial and coronal planes. 3-D MIP images were generated in the coronal    plane. Images were reviewed on the PACS workstation. All images including the 3D MIPS were permanently archived. All CT scans at this facility use dose modulation, iterative reconstruction, and/or weight based dosing when appropriate to reduce    radiation dose to as low as reasonably achievable. FINDINGS: Pulmonary arteries: There is good opacification of the main, lobar, segmental and proximal subsegmental pulmonary artery branches. No sign of pulmonary embolus in the central pulmonary arterial tree. No evidence of thoracic aortic aneurysm or    dissection. Lungs: Significant progression of previous bilateral lower lobe infiltrates. There now are diffuse patchy consolidations with an upper lobe predominance and diffuse reticular nodular changes.  Imaging features can be seen    with(COVID-19) pneumonia, though they are nonspecific and can occur with a variety of infectious or noninfectious processes. Mediastinum: Small stable probable reactive bilateral hilar and prevascular lymph nodes. Trachea:Negative                                 Vessels:Thoracic aorta is intact. Visualized abdomen: The visualized portions of the upper abdomen are unremarkable. Bones: No acute bone pathology      IMPRESSION: NO EVIDENCE OF PULMONARY EMBOLISM. SIGNIFICANT PROGRESSION OF DIFFUSE BILATERAL INFILTRATES               CTA CHEST [address]    (Results Pending)       REEVALUATION   Patient was placed on nonrebreather and her oxygen saturation improved to mid 90s. Arterial blood gases were obtained which shows significant hypoxia. On concerns of COVID-19 patient was placed in a negative pressure room. Droplet precautions were initiated. Patient was updated the results of labs and Radiology. Spoke with the hospitalist Dr Iván Cordoba accepted patient admission. I have personally provided  30  minutes of critical care time exclusive of time spent on separately billable procedures. Time includes review of laboratory data, radiology results, discussion with consultants, and monitoring for potential decompensation. Interventions were performed as documented above.     Labs  Labs Reviewed   CBC WITH AUTO DIFFERENTIAL - Abnormal; Notable for the following components:       Result Value    WBC 20.9 (*)     Platelets 783 (*)     Neutrophils Absolute 18.7 (*)     Lymphocytes Absolute 0.8 (*)     Monocytes Absolute 1.0 (*)     All other components within normal limits   COMPREHENSIVE METABOLIC PANEL W/ REFLEX TO MG FOR LOW K - Abnormal; Notable for the following components:    Glucose 123 (*)     Alb 3.1 (*)     ALT 38 (*)     Globulin 3.9 (*)     All other components within normal limits   MICROSCOPIC URINALYSIS - Abnormal; Notable for the following components:    RBC, UA 5-10 (*)     Bacteria, UA RARE (*)     All other components within normal limits   POCT ARTERIAL - Abnormal; Notable for the following components:    POC Sodium 135 (*)     pH, Arterial 7.471 (*)     pCO2, Arterial 31 (*)     pO2, Arterial 72 (*)     O2 Sat, Arterial 96 (*)     All other components within normal limits   CULTURE, BLOOD 1    Narrative:     ORDER#: [id number]                          ORDERED BY: KATARZYNA LYNN                  SOURCE: Blood                              COLLECTED:  07/31/20 19:56                  ANTIBIOTICS AT SHELL.:                      RECEIVED :  07/31/20 21:20   CULTURE, BLOOD 2    Narrative:     ORDER#: [id number]                          ORDERED BY: KATARZYNA Hernandez                  SOURCE: Blood                              COLLECTED:  07/31/20 19:57                  ANTIBIOTICS AT SHELL.:                      RECEIVED :  07/31/20 21:20   GRAM STAIN   TROPONIN   BRAIN NATRIURETIC PEPTIDE   PROTIME-INR   APTT   URINALYSIS   HCG, QUANTITATIVE, PREGNANCY   COVID-19   POCT EPOC BLOOD GAS, LACTIC ACID, ICA             Summation      Patient Course:     ED Medications administered this visit:    Medications   cefTRIAXone (ROCEPHIN) 1 g IVPB in 50 mL D5W minibag (0 g Intravenous Stopped 7/31/20 2158)     And   levoFLOXacin (LEVAQUIN) 750 MG/150ML infusion 750 mg (0 mg Intravenous Stopped 7/31/20 2235)   0.9 % sodium chloride bolus (0 mLs Intravenous Stopped 7/31/20 2236)   ipratropium-albuterol (DUONEB) nebulizer solution 1 ampule (1 ampule Inhalation Given 7/31/20 2025)   vancomycin (VANCOCIN) 1,000 mg in dextrose 5 % 250 mL IVPB (0 mg Intravenous Patient Transferred to Other Facility 7/31/20 2236)   iopamidol (ISOVUE-370) 76 % injection 100 mL (100 mLs Intravenous Given 7/31/20 2108)       New Prescriptions from this visit:    Discharge Medication List as of 7/31/2020 10:50 PM          Follow-up:  No follow-up provider specified.       Final Impression:   1.  Acute respiratory failure with hypoxia ([facility] [address]               (Please note that portions of this note were completed with a voice recognition program.  Efforts were made to edit the dictations but occasionally words are mis-transcribed.)            Dandre Mata MD  08/03/20 0559

## 2020-08-03 NOTE — PLAN OF CARE
Problem: Falls - Risk of:  Goal: Will remain free from falls  Description: Will remain free from falls  8/3/2020 1800 by Hollis Shell RN  Outcome: Completed  8/3/2020 0741 by Hollis Shell RN  Outcome: Ongoing  Goal: Absence of physical injury  Description: Absence of physical injury  8/3/2020 1800 by Hollis Shell RN  Outcome: Completed  8/3/2020 0741 by Hollis Shell RN  Outcome: Ongoing     Problem:  Activity:  Goal: Fatigue will decrease  Description: Fatigue will decrease  8/3/2020 1800 by Hollis Shell RN  Outcome: Completed  8/3/2020 0741 by Hollis Shell RN  Outcome: Ongoing     Problem: Cardiac:  Goal: Hemodynamic stability will improve  Description: Hemodynamic stability will improve  8/3/2020 1800 by Hollis Shell RN  Outcome: Completed  8/3/2020 0741 by Hollis Shell RN  Outcome: Ongoing     Problem: Coping:  Goal: Level of anxiety will decrease  Description: Level of anxiety will decrease  8/3/2020 1800 by Hollis Shell RN  Outcome: Completed  8/3/2020 0741 by Hollis Shell RN  Outcome: Ongoing  Goal: Ability to cope will improve  Description: Ability to cope will improve  8/3/2020 1800 by Hollis Shell RN  Outcome: Completed  8/3/2020 0741 by Hollis Shell RN  Outcome: Ongoing  Goal: Ability to establish a method of communication will improve  Description: Ability to establish a method of communication will improve  8/3/2020 1800 by Hollis Shell RN  Outcome: Completed  8/3/2020 0741 by Hollis Shell RN  Outcome: Ongoing     Problem: Nutritional:  Goal: Consumption of the prescribed amount of daily calories will improve  Description: Consumption of the prescribed amount of daily calories will improve  8/3/2020 1800 by Hollis Shell RN  Outcome: Completed  8/3/2020 0741 by Hollis Shell RN  Outcome: Ongoing     Problem: Respiratory:  Goal: Ability to maintain a clear airway will improve  Description: Ability to maintain a clear airway will improve  8/3/2020 1800 by Hollis Shell RN  Outcome: Completed  8/3/2020 0741 by Nu Sanders RN  Outcome: Ongoing  Goal: Ability to maintain adequate ventilation will improve  Description: Ability to maintain adequate ventilation will improve  8/3/2020 1800 by Nu Sanders RN  Outcome: Completed  8/3/2020 0741 by Nu Sanders RN  Outcome: Ongoing  Goal: Complications related to the disease process, condition or treatment will be avoided or minimized  Description: Complications related to the disease process, condition or treatment will be avoided or minimized  8/3/2020 1800 by Nu Sanders RN  Outcome: Completed  8/3/2020 0741 by Nu Sanders RN  Outcome: Ongoing     Problem: Skin Integrity:  Goal: Risk for impaired skin integrity will decrease  Description: Risk for impaired skin integrity will decrease  8/3/2020 1800 by Nu Sanders RN  Outcome: Completed  8/3/2020 0741 by Nu Sanders RN  Outcome: Ongoing     Problem: Discharge Planning:  Goal: Participates in care planning  Description: Participates in care planning  8/3/2020 1800 by Nu Sanders RN  Outcome: Completed  8/3/2020 0741 by Nu Sanders RN  Outcome: Ongoing  Goal: Discharged to appropriate level of care  Description: Discharged to appropriate level of care  8/3/2020 1800 by Nu Sanders RN  Outcome: Completed  8/3/2020 0741 by Nu Sanders RN  Outcome: Ongoing     Problem: Airway Clearance - Ineffective:  Goal: Ability to maintain a clear airway will improve  Description: Ability to maintain a clear airway will improve  8/3/2020 1800 by Nu Sanders RN  Outcome: Completed  8/3/2020 0741 by Nu Sanders RN  Outcome: Ongoing     Problem: Anxiety/Stress:  Goal: Level of anxiety will decrease  Description: Level of anxiety will decrease  8/3/2020 1800 by Nu Sanders RN  Outcome: Completed  8/3/2020 0741 by Nu Sanders RN  Outcome: Ongoing     Problem: Aspiration:  Goal: Absence of aspiration  Description: Absence of aspiration  8/3/2020 1800 by Nu Sanders Jazmin Reed RN  Outcome: Completed  8/3/2020 0741 by Jazmin Reed RN  Outcome: Ongoing  Goal: Control of acute pain  Description: Control of acute pain  8/3/2020 1800 by Jazmin Reed RN  Outcome: Completed  8/3/2020 0741 by Jazmin Reed RN  Outcome: Ongoing  Goal: Control of chronic pain  Description: Control of chronic pain  8/3/2020 1800 by Jazmin Reed RN  Outcome: Completed  8/3/2020 0741 by Jazmin Reed RN  Outcome: Ongoing     Problem: Serum Glucose Level - Abnormal:  Goal: Ability to maintain appropriate glucose levels will improve to within specified parameters  Description: Ability to maintain appropriate glucose levels will improve to within specified parameters  8/3/2020 1800 by Jazmin Reed RN  Outcome: Completed  8/3/2020 0741 by Jazmin Reed RN  Outcome: Ongoing     Problem: Skin Integrity - Impaired:  Goal: Will show no infection signs and symptoms  Description: Will show no infection signs and symptoms  8/3/2020 1800 by Jazmin Reed RN  Outcome: Completed  8/3/2020 0741 by Jazmin Reed RN  Outcome: Ongoing  Goal: Absence of new skin breakdown  Description: Absence of new skin breakdown  8/3/2020 1800 by Jazmin Reed RN  Outcome: Completed  8/3/2020 0741 by Jazmin Reed RN  Outcome: Ongoing     Problem: Sleep Pattern Disturbance:  Goal: Appears well-rested  Description: Appears well-rested  8/3/2020 1800 by Jazmin Reed RN  Outcome: Completed  8/3/2020 0741 by Jazmin Reed RN  Outcome: Ongoing     Problem: Tissue Perfusion, Altered:  Goal: Circulatory function within specified parameters  Description: Circulatory function within specified parameters  8/3/2020 1800 by Jazmin Reed RN  Outcome: Completed  8/3/2020 0741 by Jazmin Reed RN  Outcome: Ongoing     Problem: Tissue Perfusion - Cardiopulmonary, Altered:  Goal: Hemodynamic stability will improve  Description: Hemodynamic stability will improve  8/3/2020 1800 by Jazmin Reed RN  Outcome: Completed  8/3/2020 5233 by Oscar Butler RN  Outcome: Ongoing  Goal: Absence of angina  Description: Absence of angina  8/3/2020 1800 by Oscar Butler RN  Outcome: Completed  8/3/2020 0741 by Oscar Butler RN  Outcome: Ongoing

## 2020-08-04 LAB
ANA IGG, ELISA: NORMAL
ANCA IFA: NORMAL
L. PNEUMOPHILA SEROGP 1 UR AG: NEGATIVE
REASON FOR REJECTION: NORMAL
REJECTED TEST: NORMAL
STREP PNEUMO AG, UR: NEGATIVE

## 2020-08-05 LAB
ANA IGG, ELISA: NORMAL
HISTOPLASMA ANTIGEN, SERUM: NOT DETECTED
HISTOPLASMA INTERPETATION: NOT DETECTED

## 2020-08-06 LAB
BLOOD CULTURE, ROUTINE: NORMAL
CULTURE, BLOOD 2: NORMAL
HISTOPLASMA ABS, ID: NORMAL
MYELOPEROXIDASE AB: 1 AU/ML (ref 0–19)
SERINE PROTEASE 3 AB: 1 AU/ML (ref 0–19)

## 2020-08-10 NOTE — DISCHARGE SUMMARY
Hospital Medicine Discharge Summary    Alyssa Garces  :  1970  MRN:  [id number]    Admit date:  2020  Discharge date: August 3, 2020    Admitting Physician: Marcus Glasgow MD  Primary Care Physician:  Katia Calderon MD    Alyssa Garces is a 48 y.o. female that was admitted and treated at [facility] for the following medical issues: Active Problems:    Acute respiratory failure with hypoxia (HCC)  Resolved Problems:    * No resolved hospital problems. *      Discharge Diagnoses: Active Problems:    Acute respiratory failure with hypoxia (HCC)  Resolved Problems:    * No resolved hospital problems. *    No chief complaint on file. Hospital Course:   Alyssa Garces is a 48 y.o. female who was admitted with acute hypoxic respiratory failure. Diffuse bilateral infiltrates on imaging. Broad differential including infectious and noninfectious etiologies. Seen by pulmonology, recommended for bronchoscopy. Patient acknowledges but requests to be transferred to [facility] for further evaluation and work-up. Patient was subsequently transferred to [facility] clinic for further management pt was discharge in a stable condition. BP (!) 142/67   Pulse 60   Temp 98 °F (36.7 °C) (Oral)   Resp 16   Ht 5' 6\" (1.676 m)   Wt 175 lb 11.3 oz (79.7 kg)   SpO2 93%   BMI 28.36 kg/m²     Patient was seen by the following consultants while admitted to [facility]:   Consults:  IP CONSULT TO PHARMACY  IP CONSULT TO PULMONOLOGY    Significant Diagnostic Studies:    Xr Chest (2 Vw)    Result Date: 2020  EXAMINATION: XR CHEST (2 VW). DATE AND TIME:2020 7:53 PM CLINICAL HISTORY: Shortness of breath   SOB  COMPARISONS: 2020  FINDINGS: Diffuse patchy bilateral opacities with upper lobe predominance. Possibility of covid-19 pneumonia is raised, however the specificity of chest x-ray findings for COVID-19 pneumonia is not established. Increasing bilateral infiltrates EXAMINATION: XR CHEST (2 VW)  DATE AND TIME:7/31/2020 7:53 PM CLINICAL HISTORY: Acute chest pain. Shortness of breath  SOB  COMPARISON: None available. TECHNIQUE: Helical axial CTA of the chest was performed following the intravenous administration of 100 mL Optiray 320 intravenous contrast.  2D images were reconstructed in the axial and coronal planes. 3-D MIP images were generated in the coronal plane. Images were reviewed on the PACS workstation. All images including the 3D MIPS were permanently archived. All CT scans at this facility use dose modulation, iterative reconstruction, and/or weight based dosing when appropriate to reduce radiation dose to as low as reasonably achievable. FINDINGS: Pulmonary arteries: There is good opacification of the main, lobar, segmental and proximal subsegmental pulmonary artery branches. No sign of pulmonary embolus in the central pulmonary arterial tree. No evidence of thoracic aortic aneurysm or dissection. Lungs: Significant progression of previous bilateral lower lobe infiltrates. There now are diffuse patchy consolidations with an upper lobe predominance and diffuse reticular nodular changes. Imaging features can be seen with(COVID-19) pneumonia, though they are nonspecific and can occur with a variety of infectious or noninfectious processes. Mediastinum: Small stable probable reactive bilateral hilar and prevascular lymph nodes. Trachea:Negative                            Vessels:Thoracic aorta is intact. Visualized abdomen: The visualized portions of the upper abdomen are unremarkable. Bones: No acute bone pathology IMPRESSION: NO EVIDENCE OF PULMONARY EMBOLISM.  SIGNIFICANT PROGRESSION OF DIFFUSE BILATERAL INFILTRATES     Cta Chest W Wo Contrast    Result Date: 8/3/2020  EXAMINATION:  CHEST CTA WITH CONTRAST (PULMONARY EMBOLISM PROTOCOL) CLINICAL HISTORY:   sob   J96.01 Acute respiratory failure with hypoxia (HCC) ICD10. Recent pneumonia. Shortness of breath and fatigue. Technique:  Spiral axial CT acquisition of the chest from the thoracic inlet to the upper abdomen following IV contrast.  2-D images were reconstructed in the sagittal and coronal planes. 3-D MIPS images were generated in the coronal and axial planes. Images were reviewed on the PACS workstation. All images including the 3-D MIPS were personally archived. Contrast:  IV administration of 100 ml Isovue 370 All CT scans at this facility use dose modulation, iterative reconstruction, and/or weight based dosing when appropriate to reduce radiation dose to as low as reasonably achievable. Comparison:  CTA 6/24/2020  RESULT: Limitations:  None. Evaluation for thromboembolic disease:      - Right heart chambers:  No thromboembolic disease.      - Main pulmonary arteries:  No thromboembolic disease.      - Lobar pulmonary arteries:  No thromboembolic disease.        - Segmental pulmonary arteries:  No thromboembolic disease.        - Subsegmental pulmonary arteries:  No thromboembolic disease in visualized portions. Lines, tubes, and devices:  None. Lung parenchyma and pleura:  Central airways are patent. Patchy consolidative opacities throughout the lungs, with upper lobe predominance, new from prior. The bibasilar opacities on prior study have improved. Trace bilateral pleural effusions or pleural  thickening. No pneumothorax. Thoracic inlet, heart, and mediastinum: 1.0 cm nodule in the right thyroid lobe. Few mildly enlarged mediastinal and hilar lymph nodes, likely reactive to the lung findings. No axillary lymphadenopathy. Normal thoracic aorta. Normal pulmonary artery size. Normal heart size. No coronary artery calcifications. No pericardial effusion or thickening. Small hiatal hernia.  Bones and soft tissues:  No destructive bone lesion or acute osseous findings. Degenerative changes. Chest wall unremarkable. Upper abdomen:  No acute abnormality in the imaged upper abdomen. Lower neck: Unremarkable. No CT evidence of pulmonary embolism. Patchy consolidative opacities throughout lungs, with upper lobe predominance, new from CT 7/24/2020, likely infectious/inflammatory in etiology. Improved bibasilar consolidative opacities from prior. Mediastinal and hilar lymphadenopathy, likely reactive. Trace pleural effusions or pleural thickening. Thyroid nodule within the right lobe. Consider dedicated nonemergent thyroid ultrasound to further evaluate as clinically indicated. Discharge Medications:       Bogdan Grace   [place] Medication Instructions VRP:[id number]    Printed on:08/10/20 9223   Medication Information                      ALPRAZolam (XANAX) 0.25 MG tablet  TAKE 1 TABLET BY MOUTH EVERY DAY AS NEEDED for 30 days             [employer] EVENING PRIMROSE OIL PO  Take 1 capsule by mouth daily             famotidine (PEPCID) 20 MG tablet  Take 1 tablet by mouth 2 times daily             lactobacillus acidophilus (FLORANEX)  Take 1 tablet by mouth 3 times daily             Magnesium ([employer] TRIPLE MAGNESIUM COMPLEX) 400 MG CAPS  Take 400 mg by mouth daily             Multiple Vitamins-Minerals (THERAPEUTIC MULTIVITAMIN-MINERALS) tablet  Take 1 tablet by mouth daily             nystatin (MYCOSTATIN) 470728 UNIT/GM cream  Apply 1 Tube topically as needed             vitamin D (CHOLECALCIFEROL) 250 MCG (66997 UT) CAPS capsule  Take 1,000 Units by mouth daily             VYVANSE 30 MG capsule  take 1 capsule by mouth every morning                 Disposition:   If discharged to Home, Any [facility] needs that were indicated and/or required as been addressed and set up by Social Work. Condition at discharge: Pt was medically stable at the time of discharge. Activity: activity as tolerated    Total time taken for discharging this patient: 40 minutes.  Greater than 70% of time was spent focused exclusively on this patient. Time was taken to review chart, discuss plans with consultants, reconciling medications, discussing plan answering questions with patient.      Signed:  Brian Guzman

## 2024-03-31 NOTE — CARE COORDINATION
Call placed to [facility] - spoke with Jaimie, transfer has been initiated, awaiting accepting physician and bed.    Electronically signed by Tari Strickland RN on 8/3/2020 at 3:07 PM Patient/Caregiver provided printed discharge information.

## 2025-07-23 NOTE — PROGRESS NOTES
Pharmacy Vancomycin Consult     Vancomycin Day: 3  Current Dosin mg IV q8h    Temp max:  98.6 F    Recent Labs     20  0517 20  0523   BUN 9 13       Recent Labs     20  0523 20  0607   CREATININE 0.67 0.7       Recent Labs     20  0516 20  0523   WBC 20.3* 23.8*         Intake/Output Summary (Last 24 hours) at 2020 0838  Last data filed at 2020 0550  Gross per 24 hour   Intake 5374 ml   Output 600 ml   Net 4774 ml       Culture Date      Source                       Results    Recent Labs     20   BC No Growth to date. Any change in status will be called. Recent Labs     20   BLOODCULT2 No Growth to date. Any change in status will be called. No results for input(s): WNDABS in the last 72 hours. Ht Readings from Last 1 Encounters:   20 5' 6\" (1.676 m)        Wt Readings from Last 1 Encounters:   20 175 lb 11.3 oz (79.7 kg)         Body mass index is 28.36 kg/m². Estimated Creatinine Clearance: 102 mL/min (based on SCr of 0.7 mg/dL). Trough:   Recent Labs     20  0708   KALLIE 12.1       Assessment/Plan:  Vancomycin level is 12.1 taken 50 minutes prior to next scheduled administration, level is below target 15-20, but may not be at steady state yet (prior to third dose). Renal function stable. WIll increase to vancomycin 1250 mg IV q8h and monitor closely.  Trough ordered prior to third administration at new dose [Annual Wellness Visit] : an annual wellness visit